# Patient Record
Sex: FEMALE | Race: WHITE | Employment: FULL TIME | URBAN - METROPOLITAN AREA
[De-identification: names, ages, dates, MRNs, and addresses within clinical notes are randomized per-mention and may not be internally consistent; named-entity substitution may affect disease eponyms.]

---
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
## 2020-01-31 ENCOUNTER — OFFICE VISIT (OUTPATIENT)
Dept: INTERNAL MEDICINE | Age: 20
End: 2020-01-31

## 2020-01-31 VITALS
TEMPERATURE: 98.6 F | OXYGEN SATURATION: 97 % | WEIGHT: 253.2 LBS | DIASTOLIC BLOOD PRESSURE: 72 MMHG | SYSTOLIC BLOOD PRESSURE: 118 MMHG | HEART RATE: 73 BPM | RESPIRATION RATE: 18 BRPM | HEIGHT: 67 IN | BODY MASS INDEX: 39.74 KG/M2

## 2020-01-31 PROCEDURE — 99213 OFFICE O/P EST LOW 20 MIN: CPT | Performed by: NURSE PRACTITIONER

## 2020-01-31 ASSESSMENT — ENCOUNTER SYMPTOMS
ABDOMINAL PAIN: 0
NAUSEA: 0
CHEST TIGHTNESS: 0
WHEEZING: 0
SHORTNESS OF BREATH: 0
EYE DISCHARGE: 0
EYE REDNESS: 0
DIARRHEA: 0
COUGH: 0
BACK PAIN: 0
RHINORRHEA: 0
VOMITING: 0
SORE THROAT: 0

## 2020-03-06 ENCOUNTER — OFFICE VISIT (OUTPATIENT)
Dept: INTERNAL MEDICINE | Age: 20
End: 2020-03-06

## 2020-03-06 VITALS
HEART RATE: 85 BPM | DIASTOLIC BLOOD PRESSURE: 78 MMHG | HEIGHT: 66 IN | RESPIRATION RATE: 18 BRPM | OXYGEN SATURATION: 98 % | BODY MASS INDEX: 40.98 KG/M2 | SYSTOLIC BLOOD PRESSURE: 112 MMHG | WEIGHT: 255 LBS | TEMPERATURE: 97.6 F

## 2020-03-06 LAB — S PYO AG THROAT QL: NORMAL

## 2020-03-06 PROCEDURE — 87880 STREP A ASSAY W/OPTIC: CPT | Performed by: NURSE PRACTITIONER

## 2020-03-06 PROCEDURE — 99213 OFFICE O/P EST LOW 20 MIN: CPT | Performed by: NURSE PRACTITIONER

## 2020-03-06 RX ORDER — AMOXICILLIN AND CLAVULANATE POTASSIUM 875; 125 MG/1; MG/1
1 TABLET, FILM COATED ORAL 2 TIMES DAILY
Qty: 14 TABLET | Refills: 0 | Status: SHIPPED | OUTPATIENT
Start: 2020-03-06 | End: 2020-03-13

## 2020-03-06 RX ORDER — TRAZODONE HYDROCHLORIDE 50 MG/1
TABLET ORAL
COMMUNITY
Start: 2020-02-21 | End: 2020-03-31

## 2020-03-06 ASSESSMENT — ENCOUNTER SYMPTOMS
NAUSEA: 0
RHINORRHEA: 0
SHORTNESS OF BREATH: 0
SORE THROAT: 1
SINUS PRESSURE: 0
VOMITING: 0
COUGH: 0
WHEEZING: 0
SINUS PAIN: 0

## 2020-03-06 ASSESSMENT — PATIENT HEALTH QUESTIONNAIRE - PHQ9
2. FEELING DOWN, DEPRESSED OR HOPELESS: 0
SUM OF ALL RESPONSES TO PHQ9 QUESTIONS 1 & 2: 0
SUM OF ALL RESPONSES TO PHQ QUESTIONS 1-9: 0
1. LITTLE INTEREST OR PLEASURE IN DOING THINGS: 0
SUM OF ALL RESPONSES TO PHQ QUESTIONS 1-9: 0

## 2020-03-06 NOTE — PROGRESS NOTES
Subjective:      Patient ID: Maurilio Griffith is a 21 y.o. female who presents today for:  Chief Complaint   Patient presents with    Pharyngitis     x 2 days, right ear pain, nasal congestion        Pharyngitis   This is a new problem. Episode onset: 2 days ago. The problem occurs constantly. The problem has been gradually worsening. Associated symptoms include congestion and a sore throat. Pertinent negatives include no arthralgias, chest pain, chills, coughing, fatigue, fever, headaches, myalgias, nausea, rash or vomiting. Nothing aggravates the symptoms. Treatments tried: dayquil, nyquil. The treatment provided mild relief. History reviewed. No pertinent past medical history. Past Surgical History:   Procedure Laterality Date    TONSILLECTOMY AND ADENOIDECTOMY      WISDOM TOOTH EXTRACTION       History reviewed. No pertinent family history. Allergies   Allergen Reactions    Bee Venom Swelling         Review of Systems   Constitutional: Negative for chills, fatigue and fever. HENT: Positive for congestion, ear pain and sore throat. Negative for postnasal drip, rhinorrhea, sinus pressure and sinus pain. Respiratory: Negative for cough, shortness of breath and wheezing. Cardiovascular: Negative for chest pain. Gastrointestinal: Negative for nausea and vomiting. Musculoskeletal: Negative for arthralgias and myalgias. Skin: Negative for rash. Neurological: Negative for headaches. Objective:   /78   Pulse 85   Temp 97.6 °F (36.4 °C) (Oral)   Resp 18   Ht 5' 6\" (1.676 m)   Wt 255 lb (115.7 kg)   SpO2 98%   BMI 41.16 kg/m²     Physical Exam  Vitals signs reviewed. Constitutional:       General: She is not in acute distress. Appearance: She is well-developed. She is not ill-appearing. HENT:      Head: Normocephalic. Right Ear: Ear canal and external ear normal. Tympanic membrane is erythematous and bulging.       Left Ear: Tympanic membrane, ear canal and external ear normal.      Nose: Nose normal. No mucosal edema, congestion or rhinorrhea. Right Sinus: No maxillary sinus tenderness or frontal sinus tenderness. Left Sinus: No maxillary sinus tenderness or frontal sinus tenderness. Mouth/Throat:      Lips: Pink. Mouth: Mucous membranes are moist.      Pharynx: Oropharynx is clear. No oropharyngeal exudate or posterior oropharyngeal erythema. Neck:      Musculoskeletal: Normal range of motion. Cardiovascular:      Rate and Rhythm: Normal rate and regular rhythm. Heart sounds: Normal heart sounds. Pulmonary:      Effort: Pulmonary effort is normal. No respiratory distress. Breath sounds: Normal breath sounds. No decreased breath sounds or wheezing. Abdominal:      General: Bowel sounds are normal.      Palpations: Abdomen is soft. Musculoskeletal: Normal range of motion. Lymphadenopathy:      Cervical: No cervical adenopathy. Skin:     General: Skin is warm and dry. Neurological:      Mental Status: She is alert and oriented to person, place, and time. Assessment:       Diagnosis Orders   1. Non-recurrent acute suppurative otitis media of right ear without spontaneous rupture of tympanic membrane  amoxicillin-clavulanate (AUGMENTIN) 875-125 MG per tablet   2. Sore throat  POCT rapid strep A         Plan:      Orders Placed This Encounter   Procedures    POCT rapid strep A     Orders Placed This Encounter   Medications    amoxicillin-clavulanate (AUGMENTIN) 875-125 MG per tablet     Sig: Take 1 tablet by mouth 2 times daily for 7 days     Dispense:  14 tablet     Refill:  0     Rapid strep negative. Treated for right OM. Reviewed usual course of illness and supportive measures for symptom management. Administration, side effects/adverse effects of all medications prescribed today, as well as treatment plan/rationale, follow-up care, and result expectations have been discussed with the patient.   Expresses understanding and desires to proceed with the treatment plan. Discussed signs and symptoms which require immediate follow-up in ED/call to 911. Understanding verbalized. I have reviewed and updated the electronic medical record. Return if symptoms worsen or fail to improve, for follow up with PCP.       MESFIN Suggs - NP

## 2020-03-09 LAB
ORGANISM: ABNORMAL
THROAT CULTURE: ABNORMAL
THROAT CULTURE: ABNORMAL

## 2020-03-23 ENCOUNTER — NURSE ONLY (OUTPATIENT)
Dept: FAMILY MEDICINE CLINIC | Age: 20
End: 2020-03-23

## 2020-03-23 PROCEDURE — 96372 THER/PROPH/DIAG INJ SC/IM: CPT | Performed by: FAMILY MEDICINE

## 2020-03-23 RX ORDER — MEDROXYPROGESTERONE ACETATE 150 MG/ML
150 INJECTION, SUSPENSION INTRAMUSCULAR ONCE
Status: COMPLETED | OUTPATIENT
Start: 2020-03-23 | End: 2020-03-23

## 2020-03-23 RX ADMIN — MEDROXYPROGESTERONE ACETATE 150 MG: 150 INJECTION, SUSPENSION INTRAMUSCULAR at 13:29

## 2020-03-31 RX ORDER — TRAZODONE HYDROCHLORIDE 50 MG/1
TABLET ORAL
Qty: 30 TABLET | Refills: 2 | Status: SHIPPED | OUTPATIENT
Start: 2020-03-31 | End: 2022-01-13

## 2020-03-31 NOTE — TELEPHONE ENCOUNTER
Rx requested:  Requested Prescriptions     Pending Prescriptions Disp Refills    traZODone (DESYREL) 50 MG tablet [Pharmacy Med Name: TRAZODONE 50 MG TABLET] 30 tablet      Sig: take 1 tablet by mouth NIGHTLY       Last Office Visit:   12/30/2019      Last filled:  2/21/2020    Next Visit Date:  Future Appointments   Date Time Provider Brittney Antonio   6/23/2020 11:15 AM SCHEDULE, [address] [facility]

## 2020-04-23 RX ORDER — TRAZODONE HYDROCHLORIDE 50 MG/1
TABLET ORAL
Qty: 30 TABLET | Refills: 2 | OUTPATIENT
Start: 2020-04-23

## 2020-06-11 ENCOUNTER — TELEPHONE (OUTPATIENT)
Dept: FAMILY MEDICINE CLINIC | Age: 20
End: 2020-06-11

## 2020-06-11 NOTE — TELEPHONE ENCOUNTER
Spotting while on depo shot is common and usually improves over time. The shot is still working.  Can come on time like usual.

## 2020-06-23 ENCOUNTER — NURSE ONLY (OUTPATIENT)
Dept: FAMILY MEDICINE CLINIC | Age: 20
End: 2020-06-23

## 2020-06-23 LAB
CONTROL: NORMAL
PREGNANCY TEST URINE, POC: NORMAL

## 2020-06-23 PROCEDURE — 81025 URINE PREGNANCY TEST: CPT | Performed by: FAMILY MEDICINE

## 2020-06-23 PROCEDURE — 96372 THER/PROPH/DIAG INJ SC/IM: CPT | Performed by: FAMILY MEDICINE

## 2020-06-23 RX ORDER — MEDROXYPROGESTERONE ACETATE 150 MG/ML
150 INJECTION, SUSPENSION INTRAMUSCULAR ONCE
Status: COMPLETED | OUTPATIENT
Start: 2020-06-23 | End: 2020-06-23

## 2020-06-23 RX ADMIN — MEDROXYPROGESTERONE ACETATE 150 MG: 150 INJECTION, SUSPENSION INTRAMUSCULAR at 11:24

## 2020-09-15 ENCOUNTER — NURSE ONLY (OUTPATIENT)
Dept: FAMILY MEDICINE CLINIC | Age: 20
End: 2020-09-15

## 2020-09-15 PROCEDURE — 96372 THER/PROPH/DIAG INJ SC/IM: CPT | Performed by: FAMILY MEDICINE

## 2020-09-15 RX ORDER — MEDROXYPROGESTERONE ACETATE 150 MG/ML
150 INJECTION, SUSPENSION INTRAMUSCULAR ONCE
Status: COMPLETED | OUTPATIENT
Start: 2020-09-15 | End: 2020-09-15

## 2020-09-15 RX ADMIN — MEDROXYPROGESTERONE ACETATE 150 MG: 150 INJECTION, SUSPENSION INTRAMUSCULAR at 13:19

## 2020-09-21 ENCOUNTER — APPOINTMENT (OUTPATIENT)
Dept: GENERAL RADIOLOGY | Age: 20
End: 2020-09-21

## 2020-09-21 ENCOUNTER — HOSPITAL ENCOUNTER (EMERGENCY)
Age: 20
Discharge: HOME | End: 2020-09-21
Attending: EMERGENCY MEDICINE

## 2020-09-21 VITALS
DIASTOLIC BLOOD PRESSURE: 70 MMHG | WEIGHT: 221 LBS | OXYGEN SATURATION: 99 % | RESPIRATION RATE: 18 BRPM | TEMPERATURE: 98.4 F | BODY MASS INDEX: 35.52 KG/M2 | HEART RATE: 95 BPM | SYSTOLIC BLOOD PRESSURE: 116 MMHG | HEIGHT: 66 IN

## 2020-09-21 LAB
BILIRUBIN URINE: NEGATIVE
BLOOD, URINE: NEGATIVE
CLARITY: CLEAR
COLOR: YELLOW
GLUCOSE URINE: NEGATIVE MG/DL
HCG(URINE) PREGNANCY TEST: NEGATIVE
KETONES, URINE: NEGATIVE MG/DL
LEUKOCYTE ESTERASE, URINE: NEGATIVE
NITRITE, URINE: NEGATIVE
PH UA: 6.5 (ref 5–9)
PROTEIN UA: NEGATIVE MG/DL
SPECIFIC GRAVITY UA: 1.02 (ref 1–1.03)
URINE REFLEX TO CULTURE: NORMAL
UROBILINOGEN, URINE: 0.2 E.U./DL

## 2020-09-21 PROCEDURE — 81003 URINALYSIS AUTO W/O SCOPE: CPT

## 2020-09-21 PROCEDURE — 99283 EMERGENCY DEPT VISIT LOW MDM: CPT

## 2020-09-21 PROCEDURE — 84703 CHORIONIC GONADOTROPIN ASSAY: CPT

## 2020-09-21 PROCEDURE — 96372 THER/PROPH/DIAG INJ SC/IM: CPT

## 2020-09-21 PROCEDURE — 6360000002 HC RX W HCPCS: Performed by: EMERGENCY MEDICINE

## 2020-09-21 PROCEDURE — 72110 X-RAY EXAM L-2 SPINE 4/>VWS: CPT

## 2020-09-21 PROCEDURE — 6370000000 HC RX 637 (ALT 250 FOR IP): Performed by: EMERGENCY MEDICINE

## 2020-09-21 RX ORDER — HYDROCODONE BITARTRATE AND ACETAMINOPHEN 5; 325 MG/1; MG/1
1 TABLET ORAL EVERY 6 HOURS PRN
Qty: 12 TABLET | Refills: 0 | Status: SHIPPED | OUTPATIENT
Start: 2020-09-21 | End: 2020-09-25

## 2020-09-21 RX ORDER — KETOROLAC TROMETHAMINE 30 MG/ML
60 INJECTION, SOLUTION INTRAMUSCULAR; INTRAVENOUS ONCE
Status: COMPLETED | OUTPATIENT
Start: 2020-09-21 | End: 2020-09-21

## 2020-09-21 RX ORDER — CYCLOBENZAPRINE HCL 10 MG
10 TABLET ORAL ONCE
Status: COMPLETED | OUTPATIENT
Start: 2020-09-21 | End: 2020-09-21

## 2020-09-21 RX ORDER — CYCLOBENZAPRINE HCL 10 MG
10 TABLET ORAL 3 TIMES DAILY PRN
Qty: 21 TABLET | Refills: 0 | Status: SHIPPED | OUTPATIENT
Start: 2020-09-21 | End: 2020-10-01

## 2020-09-21 RX ORDER — PHENYLPROPANOLAMINE/CLEMASTINE 75-1.34MG
5 TABLET, EXTENDED RELEASE ORAL ONCE
COMMUNITY
End: 2022-01-13

## 2020-09-21 RX ADMIN — KETOROLAC TROMETHAMINE 60 MG: 30 INJECTION, SOLUTION INTRAMUSCULAR at 16:39

## 2020-09-21 RX ADMIN — CYCLOBENZAPRINE HYDROCHLORIDE 10 MG: 10 TABLET, FILM COATED ORAL at 16:39

## 2020-09-21 ASSESSMENT — PAIN SCALES - GENERAL
PAINLEVEL_OUTOF10: 5
PAINLEVEL_OUTOF10: 9
PAINLEVEL_OUTOF10: 8

## 2020-09-21 ASSESSMENT — ENCOUNTER SYMPTOMS
SORE THROAT: 0
CHOKING: 0
EYE DISCHARGE: 0
ABDOMINAL PAIN: 0
SHORTNESS OF BREATH: 0
EYE REDNESS: 0
BACK PAIN: 1
BLOOD IN STOOL: 0
STRIDOR: 0
EYE PAIN: 0
DIARRHEA: 0
WHEEZING: 0
FACIAL SWELLING: 0
SINUS PRESSURE: 0
VOICE CHANGE: 0
CHEST TIGHTNESS: 0
COUGH: 0
VOMITING: 0
CONSTIPATION: 0
TROUBLE SWALLOWING: 0

## 2020-09-21 ASSESSMENT — PAIN DESCRIPTION - PAIN TYPE: TYPE: ACUTE PAIN

## 2020-09-21 ASSESSMENT — PAIN DESCRIPTION - DESCRIPTORS
DESCRIPTORS: ACHING
DESCRIPTORS: SHOOTING

## 2020-09-21 ASSESSMENT — PAIN - FUNCTIONAL ASSESSMENT: PAIN_FUNCTIONAL_ASSESSMENT: 0-10

## 2020-09-21 ASSESSMENT — PAIN DESCRIPTION - LOCATION: LOCATION: BACK

## 2020-09-21 ASSESSMENT — PAIN DESCRIPTION - ONSET: ONSET: ON-GOING

## 2020-09-21 ASSESSMENT — PAIN DESCRIPTION - ORIENTATION: ORIENTATION: LOWER

## 2020-09-21 ASSESSMENT — PAIN DESCRIPTION - FREQUENCY: FREQUENCY: CONTINUOUS

## 2020-09-21 ASSESSMENT — PAIN DESCRIPTION - PROGRESSION: CLINICAL_PROGRESSION: GRADUALLY IMPROVING

## 2020-09-21 NOTE — ED PROVIDER NOTES
[facility] ED  eMERGENCY dEPARTMENT eNCOUnter      Pt Name: Genna Epperson  MRN: [id number]  [place] 2000  Date of evaluation: 2020  Provider: Ghislaine Claudio MD    [address] [place] [address]       Chief Complaint   Patient presents with    Back Pain     mid to lower x 3days         HISTORY OF PRESENT ILLNESS   (Location/Symptom, Timing/Onset,Context/Setting, Quality, Duration, Modifying Factors, Severity)  Note limiting factors. Genna Epperson is a 21 y.o. female who presents to the emergency department patient come to this emergency because of the increasing back pain for the past 2 days time worse with movement or when she lie flat patient history obesity dysfunctional uterine bleeding patient is on birth control pill at this time  0 para 0 has no previous back surgery no fever no chills no UTI symptoms no history of kidney stone rated her pain as 6-7 out of 10    HPI    NursingNotes were reviewed. REVIEW OF SYSTEMS    (2-9 systems for level 4, 10 or more for level 5)     Review of Systems   Constitutional: Positive for activity change. Negative for fever. HENT: Negative for congestion, drooling, facial swelling, mouth sores, nosebleeds, sinus pressure, sore throat, trouble swallowing and voice change. Eyes: Negative for pain, discharge, redness and visual disturbance. Respiratory: Negative for cough, choking, chest tightness, shortness of breath, wheezing and stridor. Cardiovascular: Negative for chest pain, palpitations and leg swelling. Gastrointestinal: Negative for abdominal pain, blood in stool, constipation, diarrhea and vomiting. Endocrine: Negative for cold intolerance, polyphagia and polyuria. Genitourinary: Negative for dysuria, flank pain, frequency, genital sores and urgency. Musculoskeletal: Positive for arthralgias, back pain, gait problem and myalgias. Negative for joint swelling, neck pain and neck stiffness. Skin: Negative for pallor and rash. Neurological: Negative for tremors, seizures, syncope, weakness, numbness and headaches. Hematological: Negative for adenopathy. Does not bruise/bleed easily. Psychiatric/Behavioral: Negative for agitation, behavioral problems, hallucinations and sleep disturbance. The patient is not hyperactive. All other systems reviewed and are negative. Except as noted above the remainder of the review of systems was reviewed and negative. PAST MEDICAL HISTORY   History reviewed. No pertinent past medical history. SURGICALHISTORY       Past Surgical History:   Procedure Laterality Date    TONSILLECTOMY AND ADENOIDECTOMY      WISDOM TOOTH EXTRACTION           CURRENT MEDICATIONS       Previous Medications    EPINEPHRINE (EPIPEN) 0.3 MG/0.3ML SOAJ INJECTION    Inject into the skin    ESTRADIOL CYPIONATE (DEPO-ESTRADIOL) 5 MG/ML INJECTION    Inject 5 mg into the muscle once    TRAZODONE (DESYREL) 50 MG TABLET    take 1 tablet by mouth NIGHTLY       ALLERGIES     Bee venom    FAMILY HISTORY     History reviewed. No pertinent family history.        SOCIAL HISTORY       Social History     Socioeconomic History    Marital status: Single     Spouse name: None    Number of children: None    Years of education: None    Highest education level: None   Occupational History    None   Social Needs    Financial resource strain: None    Food insecurity     Worry: None     Inability: None    Transportation needs     Medical: None     Non-medical: None   Tobacco Use    Smoking status: Passive Smoke Exposure - Never Smoker    Smokeless tobacco: Never Used   Substance and Sexual Activity    Alcohol use: No    Drug use: No    Sexual activity: Never   Lifestyle    Physical activity     Days per week: None     Minutes per session: None    Stress: None   Relationships    Social connections     Talks on phone: None     Gets together: None     Attends [employer] service: None     Active member of club or organization: None     Attends meetings of clubs or organizations: None     Relationship status: None    Intimate partner violence     Fear of current or ex partner: None     Emotionally abused: None     Physically abused: None     Forced sexual activity: None   Other Topics Concern    None   Social History Narrative    None       SCREENINGS      @FLOW([id number])@      PHYSICAL EXAM    (up to 7 for level 4, 8 or more for level 5)     ED Triage Vitals [09/21/20 1609]   BP Temp Temp Source Pulse Resp SpO2 Height Weight   -- 98.3 °F (36.8 °C) Oral 69 18 98 % 5' 6\" (1.676 m) 221 lb (100.2 kg)       Physical Exam  Vitals signs and nursing note reviewed. Constitutional:       Appearance: She is well-developed. She is obese. Comments: Active alert cooperative patient slightly uncomfortable when change position otherwise nontoxic appearance afebrile no evidence of dehydration   HENT:      Head: Normocephalic and atraumatic. Right Ear: Tympanic membrane, ear canal and external ear normal.      Left Ear: Tympanic membrane, ear canal and external ear normal.      Nose: Nose normal.      Mouth/Throat:      Mouth: Mucous membranes are moist.   Eyes:      General:         Left eye: No discharge. Extraocular Movements: Extraocular movements intact. Pupils: Pupils are equal, round, and reactive to light. Neck:      Musculoskeletal: Normal range of motion and neck supple. No neck rigidity or muscular tenderness. Vascular: No carotid bruit. Cardiovascular:      Rate and Rhythm: Normal rate and regular rhythm. Heart sounds: Normal heart sounds. No murmur. No friction rub. No gallop. Pulmonary:      Effort: Pulmonary effort is normal. No respiratory distress. Breath sounds: Normal breath sounds. No wheezing. Abdominal:      General: Bowel sounds are normal. There is no distension. Palpations: Abdomen is soft. There is no mass. Tenderness: There is no abdominal tenderness.  There 18   Temp: 98.3 °F (36.8 °C)   TempSrc: Oral   SpO2: 98%   Weight: 221 lb (100.2 kg)   Height: 5' 6\" (1.676 m)           MDM    CRITICAL CARE TIME   Total Critical Care time was  minutes, excluding separately reportableprocedures. There was a high probability of clinicallysignificant/life threatening deterioration in the patient's condition which required my urgent intervention. NSULTS:  None    PROCEDURES:  Unless otherwise noted below, none     Procedures    FINAL IMPRESSION      1. Strain of lumbar region, initial encounter    2. Spasm of muscle          DISPOSITION/PLAN   DISPOSITION Decision To Discharge 09/21/2020 05:11:36 PM      PATIENT REFERRED TO:  Sukhjinder Butler MD  [address] [facility] [address] [phone]    In 1 week        DISCHARGE MEDICATIONS:  New Prescriptions    CYCLOBENZAPRINE (FLEXERIL) 10 MG TABLET    Take 1 tablet by mouth 3 times daily as needed for Muscle spasms    HYDROCODONE-ACETAMINOPHEN (NORCO) 5-325 MG PER TABLET    Take 1 tablet by mouth every 6 hours as needed for Pain for up to 4 days.           (Please note that portions of this note were completed with a voice recognition program.  Efforts were made to edit the dictations but occasionally words are mis-transcribed.)    Clearance MD Fahad (electronically signed)  Attending Emergency Physician       Clearance MD Fahad  09/21/20

## 2020-12-08 ENCOUNTER — NURSE ONLY (OUTPATIENT)
Dept: FAMILY MEDICINE CLINIC | Age: 20
End: 2020-12-08

## 2020-12-08 PROCEDURE — 96372 THER/PROPH/DIAG INJ SC/IM: CPT | Performed by: FAMILY MEDICINE

## 2020-12-08 RX ORDER — MEDROXYPROGESTERONE ACETATE 150 MG/ML
150 INJECTION, SUSPENSION INTRAMUSCULAR ONCE
Status: COMPLETED | OUTPATIENT
Start: 2020-12-08 | End: 2020-12-08

## 2020-12-08 RX ADMIN — MEDROXYPROGESTERONE ACETATE 150 MG: 150 INJECTION, SUSPENSION INTRAMUSCULAR at 13:28

## 2021-02-23 ENCOUNTER — NURSE ONLY (OUTPATIENT)
Dept: FAMILY MEDICINE CLINIC | Age: 21
End: 2021-02-23

## 2021-02-23 DIAGNOSIS — N93.8 DUB (DYSFUNCTIONAL UTERINE BLEEDING): Primary | ICD-10-CM

## 2021-02-23 PROCEDURE — 96372 THER/PROPH/DIAG INJ SC/IM: CPT | Performed by: FAMILY MEDICINE

## 2021-02-23 RX ORDER — MEDROXYPROGESTERONE ACETATE 150 MG/ML
150 INJECTION, SUSPENSION INTRAMUSCULAR ONCE
Status: COMPLETED | OUTPATIENT
Start: 2021-02-23 | End: 2021-02-23

## 2021-02-23 RX ADMIN — MEDROXYPROGESTERONE ACETATE 150 MG: 150 INJECTION, SUSPENSION INTRAMUSCULAR at 10:59

## 2021-02-28 ENCOUNTER — APPOINTMENT (OUTPATIENT)
Dept: GENERAL RADIOLOGY | Age: 21
End: 2021-02-28

## 2021-02-28 ENCOUNTER — HOSPITAL ENCOUNTER (EMERGENCY)
Age: 21
Discharge: HOME | End: 2021-02-28

## 2021-02-28 VITALS
SYSTOLIC BLOOD PRESSURE: 118 MMHG | HEIGHT: 66 IN | DIASTOLIC BLOOD PRESSURE: 82 MMHG | BODY MASS INDEX: 39.37 KG/M2 | RESPIRATION RATE: 17 BRPM | OXYGEN SATURATION: 99 % | WEIGHT: 245 LBS | TEMPERATURE: 96.9 F | HEART RATE: 95 BPM

## 2021-02-28 DIAGNOSIS — M62.838 SPASM OF MUSCLE: ICD-10-CM

## 2021-02-28 DIAGNOSIS — S39.012A STRAIN OF LUMBAR REGION, INITIAL ENCOUNTER: Primary | ICD-10-CM

## 2021-02-28 DIAGNOSIS — S39.012A BACK STRAIN, INITIAL ENCOUNTER: ICD-10-CM

## 2021-02-28 PROCEDURE — 6370000000 HC RX 637 (ALT 250 FOR IP): Performed by: PHYSICIAN ASSISTANT

## 2021-02-28 PROCEDURE — 99284 EMERGENCY DEPT VISIT MOD MDM: CPT

## 2021-02-28 PROCEDURE — 72110 X-RAY EXAM L-2 SPINE 4/>VWS: CPT

## 2021-02-28 PROCEDURE — 72072 X-RAY EXAM THORAC SPINE 3VWS: CPT

## 2021-02-28 RX ORDER — CYCLOBENZAPRINE HCL 10 MG
10 TABLET ORAL 3 TIMES DAILY PRN
Qty: 21 TABLET | Refills: 0 | Status: SHIPPED | OUTPATIENT
Start: 2021-02-28 | End: 2021-03-10

## 2021-02-28 RX ORDER — CYCLOBENZAPRINE HCL 10 MG
10 TABLET ORAL ONCE
Status: COMPLETED | OUTPATIENT
Start: 2021-02-28 | End: 2021-02-28

## 2021-02-28 RX ORDER — OXYCODONE HYDROCHLORIDE AND ACETAMINOPHEN 5; 325 MG/1; MG/1
1 TABLET ORAL ONCE
Status: COMPLETED | OUTPATIENT
Start: 2021-02-28 | End: 2021-02-28

## 2021-02-28 RX ADMIN — OXYCODONE HYDROCHLORIDE AND ACETAMINOPHEN 1 TABLET: 5; 325 TABLET ORAL at 10:47

## 2021-02-28 RX ADMIN — CYCLOBENZAPRINE 10 MG: 10 TABLET, FILM COATED ORAL at 10:46

## 2021-02-28 ASSESSMENT — ENCOUNTER SYMPTOMS
VOMITING: 0
ABDOMINAL PAIN: 0
CONSTIPATION: 0
NAUSEA: 0
COLOR CHANGE: 0
CHEST TIGHTNESS: 0
WHEEZING: 0
SHORTNESS OF BREATH: 0
DIARRHEA: 0
BACK PAIN: 1

## 2021-02-28 ASSESSMENT — PAIN SCALES - GENERAL
PAINLEVEL_OUTOF10: 7
PAINLEVEL_OUTOF10: 8

## 2021-02-28 ASSESSMENT — PAIN DESCRIPTION - ONSET: ONSET: ON-GOING

## 2021-02-28 ASSESSMENT — PAIN DESCRIPTION - PAIN TYPE: TYPE: ACUTE PAIN

## 2021-02-28 ASSESSMENT — PAIN DESCRIPTION - ORIENTATION: ORIENTATION: MID

## 2021-02-28 ASSESSMENT — PAIN DESCRIPTION - LOCATION: LOCATION: BACK

## 2021-02-28 NOTE — ED PROVIDER NOTES
[facility] ED  eMERGENCY dEPARTMENT eNCOUnter      Pt Name: Swati Pereira  MRN: [id number]  [place] 2000  Date of evaluation: 2/28/2021  Provider: KRISTEN Flores        HISTORY OF PRESENT ILLNESS    Swati Pereira is a 24 y.o. female per chart review has a h/o DUB, obesity, bee sting allergy, presents to the ED with c/o sudden onset mid-low back pain with associated spasms without pain radiation since last night. Notes inciting event was small child that jumped on her back, pushing her forward. She denies falling, and notes she was able to stand up and \"correct\" herself, however felt sharp pain to back following event. Notes she has had a previous back strain with spasms and \"this feels similar to that time\". Denies saddle anesthesia, IVDU or urinary complaints/symptoms. REVIEW OF SYSTEMS       Review of Systems   Constitutional: Positive for activity change. Negative for appetite change, chills, diaphoresis, fatigue and fever. Respiratory: Negative for chest tightness, shortness of breath and wheezing. Cardiovascular: Negative for chest pain, palpitations and leg swelling. Gastrointestinal: Negative for abdominal pain, constipation, diarrhea, nausea and vomiting. Genitourinary: Negative for difficulty urinating, dysuria, enuresis, flank pain, frequency, hematuria, pelvic pain and urgency. Musculoskeletal: Positive for arthralgias, back pain and myalgias. Negative for gait problem, joint swelling, neck pain and neck stiffness. Skin: Negative for color change, pallor, rash and wound. Neurological: Negative for dizziness, tremors, syncope, facial asymmetry, weakness, light-headedness, numbness and headaches. All other systems reviewed and are negative. Except as noted above the remainder of the review of systems was reviewed and negative. PAST MEDICAL HISTORY   History reviewed. No pertinent past medical history.       SURGICAL HISTORY       Past Surgical History: Coronary artery disease is improving with treatment. Continue current treatment regimen. Cardiac status will be reassessed in 6 months. Procedure Laterality Date    TONSILLECTOMY AND ADENOIDECTOMY      WISDOM TOOTH EXTRACTION           CURRENT MEDICATIONS       Previous Medications    EPINEPHRINE (EPIPEN) 0.3 MG/0.3ML SOAJ INJECTION    Inject into the skin    ESTRADIOL CYPIONATE (DEPO-ESTRADIOL) 5 MG/ML INJECTION    Inject 5 mg into the muscle once    TRAZODONE (DESYREL) 50 MG TABLET    take 1 tablet by mouth NIGHTLY       ALLERGIES     Bee venom    FAMILY HISTORY     History reviewed. No pertinent family history. SOCIAL HISTORY       Social History     Socioeconomic History    Marital status: Single     Spouse name: None    Number of children: None    Years of education: None    Highest education level: None   Occupational History    None   Social Needs    Financial resource strain: None    Food insecurity     Worry: None     Inability: None    Transportation needs     Medical: None     Non-medical: None   Tobacco Use    Smoking status: Passive Smoke Exposure - Never Smoker    Smokeless tobacco: Never Used   Substance and Sexual Activity    Alcohol use: No    Drug use: No    Sexual activity: Never   Lifestyle    Physical activity     Days per week: None     Minutes per session: None    Stress: None   Relationships    Social connections     Talks on phone: None     Gets together: None     Attends Denominational service: None     Active member of club or organization: None     Attends meetings of clubs or organizations: None     Relationship status: None    Intimate partner violence     Fear of current or ex partner: None     Emotionally abused: None     Physically abused: None     Forced sexual activity: None   Other Topics Concern    None   Social History Narrative    None         PHYSICAL EXAM        ED Triage Vitals [02/28/21 1029]   BP Temp Temp Source Pulse Resp SpO2 Height Weight   118/82 96.9 °F (36.1 °C) Temporal 95 17 99 % 5' 6\" (1.676 m) 245 lb (111.1 kg)       Physical Exam  Vitals signs and nursing note reviewed. Constitutional:       General: She is not in acute distress. Appearance: Normal appearance. She is normal weight. She is not toxic-appearing. HENT:      Head: Normocephalic and atraumatic. Nose: Nose normal.      Mouth/Throat:      Mouth: Mucous membranes are moist.      Pharynx: Oropharynx is clear. No oropharyngeal exudate. Eyes:      Extraocular Movements: Extraocular movements intact. Conjunctiva/sclera: Conjunctivae normal.      Pupils: Pupils are equal, round, and reactive to light. Neck:      Musculoskeletal: Normal range of motion and neck supple. No muscular tenderness. Cardiovascular:      Rate and Rhythm: Normal rate and regular rhythm. Pulses: Normal pulses. Heart sounds: Normal heart sounds. Pulmonary:      Effort: Pulmonary effort is normal.      Breath sounds: Normal breath sounds. Abdominal:      General: Abdomen is flat. Bowel sounds are normal. There is no distension. Palpations: Abdomen is soft. Tenderness: There is no abdominal tenderness. Musculoskeletal: Normal range of motion. General: No swelling, tenderness or deformity. Right lower leg: No edema. Left lower leg: No edema. Comments: +thoracolumbar paravertebral tenderness to palpation with radiation midline, no CVAT or overlying skin changes. Lymphadenopathy:      Cervical: No cervical adenopathy. Skin:     General: Skin is warm and dry. Capillary Refill: Capillary refill takes less than 2 seconds. Neurological:      General: No focal deficit present. Mental Status: She is alert and oriented to person, place, and time. Mental status is at baseline. Sensory: No sensory deficit. Motor: No weakness. Coordination: Coordination normal.      Gait: Gait normal.      Comments: Stable gait witnessed walking into room and unassisted movement to hospital bed.     Psychiatric:         Mood and Affect: Mood normal.         Behavior: Behavior normal. Thought Content: Thought content normal.         Judgment: Judgment normal.           LABS:  Labs Reviewed - No data to display      MDM:   Vitals:    Vitals:    02/28/21 1029   BP: 118/82   Pulse: 95   Resp: 17   Temp: 96.9 °F (36.1 °C)   TempSrc: Temporal   SpO2: 99%   Weight: 245 lb (111.1 kg)   Height: 5' 6\" (1.676 m)       24year old female presents to the ED with c/o back pain. Triage records were reviewed. Medical records were reviewed. Nursing notes were reviewed and incorporated. Patient afebrile, hemodynamically stable, non-toxic in appearance upon initial evaluation. Labs/Imaging:   XR thoracic/lumbar    Consults:   NA    Meds given in ED:   Percocet, Flexeril    Presentation and work-up outlined above consistent with thoracolumbar strain. 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 Return precautions instructed. CRITICAL CARE TIME   Total CriticalCare time was 0 minutes, excluding separately reportable procedures. There was a high probability of clinically significant/life threatening deterioration in the patient's condition which required my urgent intervention. PROCEDURES:  Unlessotherwise noted below, none      Procedures      FINAL IMPRESSION      1. Strain of lumbar region, initial encounter    2. Back strain, initial encounter    3.  Spasm of muscle          DISPOSITION/PLAN   DISPOSITION Decision To Discharge 02/28/2021 11:51:06 AM          KRISTEN Hines (electronically signed)  Attending Emergency Physician          KRISTEN Hines  02/28/21 [id number]

## 2021-02-28 NOTE — ED TRIAGE NOTES
Pt presents to ED from home with c/o back pain. Pt reports that pain is located in her mid-back and has been ongoing since last night. Pt denies injury/heavy lifting. Pt states that she has used tylenol and icy-hot w/o relief. Upon assessment, pt is A/Ox4, skin p/w/d, resp even and unlabored, msp's intact. Pt denies cp, sob, n/v/d, fever, or chills.

## 2021-05-17 ENCOUNTER — NURSE ONLY (OUTPATIENT)
Dept: FAMILY MEDICINE CLINIC | Age: 21
End: 2021-05-17

## 2021-05-17 DIAGNOSIS — N93.8 DUB (DYSFUNCTIONAL UTERINE BLEEDING): Primary | ICD-10-CM

## 2021-05-17 PROCEDURE — 96372 THER/PROPH/DIAG INJ SC/IM: CPT | Performed by: FAMILY MEDICINE

## 2021-05-17 RX ORDER — MEDROXYPROGESTERONE ACETATE 150 MG/ML
150 INJECTION, SUSPENSION INTRAMUSCULAR ONCE
Status: COMPLETED | OUTPATIENT
Start: 2021-05-17 | End: 2021-05-17

## 2021-05-17 RX ADMIN — MEDROXYPROGESTERONE ACETATE 150 MG: 150 INJECTION, SUSPENSION INTRAMUSCULAR at 15:46

## 2021-08-10 ENCOUNTER — NURSE ONLY (OUTPATIENT)
Dept: FAMILY MEDICINE CLINIC | Age: 21
End: 2021-08-10

## 2021-08-10 DIAGNOSIS — N93.8 DUB (DYSFUNCTIONAL UTERINE BLEEDING): Primary | ICD-10-CM

## 2021-08-10 PROCEDURE — 96372 THER/PROPH/DIAG INJ SC/IM: CPT | Performed by: FAMILY MEDICINE

## 2021-08-10 RX ORDER — MEDROXYPROGESTERONE ACETATE 150 MG/ML
150 INJECTION, SUSPENSION INTRAMUSCULAR ONCE
Status: COMPLETED | OUTPATIENT
Start: 2021-08-10 | End: 2021-08-10

## 2021-08-10 RX ADMIN — MEDROXYPROGESTERONE ACETATE 150 MG: 150 INJECTION, SUSPENSION INTRAMUSCULAR at 13:50

## 2021-10-25 ENCOUNTER — NURSE ONLY (OUTPATIENT)
Dept: FAMILY MEDICINE CLINIC | Age: 21
End: 2021-10-25

## 2021-10-25 DIAGNOSIS — N93.8 DUB (DYSFUNCTIONAL UTERINE BLEEDING): Primary | ICD-10-CM

## 2021-10-25 PROCEDURE — 96372 THER/PROPH/DIAG INJ SC/IM: CPT | Performed by: FAMILY MEDICINE

## 2021-10-25 RX ORDER — MEDROXYPROGESTERONE ACETATE 150 MG/ML
150 INJECTION, SUSPENSION INTRAMUSCULAR ONCE
Status: COMPLETED | OUTPATIENT
Start: 2021-10-25 | End: 2021-10-25

## 2021-10-25 RX ADMIN — MEDROXYPROGESTERONE ACETATE 150 MG: 150 INJECTION, SUSPENSION INTRAMUSCULAR at 15:08

## 2022-01-13 ENCOUNTER — OFFICE VISIT (OUTPATIENT)
Dept: FAMILY MEDICINE CLINIC | Age: 22
End: 2022-01-13

## 2022-01-13 ENCOUNTER — NURSE ONLY (OUTPATIENT)
Dept: FAMILY MEDICINE CLINIC | Age: 22
End: 2022-01-13

## 2022-01-13 VITALS
OXYGEN SATURATION: 98 % | HEART RATE: 80 BPM | WEIGHT: 293 LBS | TEMPERATURE: 98 F | DIASTOLIC BLOOD PRESSURE: 70 MMHG | SYSTOLIC BLOOD PRESSURE: 122 MMHG | BODY MASS INDEX: 50.36 KG/M2

## 2022-01-13 DIAGNOSIS — F32.A DEPRESSION, UNSPECIFIED DEPRESSION TYPE: ICD-10-CM

## 2022-01-13 DIAGNOSIS — N93.8 DUB (DYSFUNCTIONAL UTERINE BLEEDING): Primary | ICD-10-CM

## 2022-01-13 DIAGNOSIS — Z86.16 HISTORY OF COVID-19: Primary | ICD-10-CM

## 2022-01-13 DIAGNOSIS — N93.8 DUB (DYSFUNCTIONAL UTERINE BLEEDING): ICD-10-CM

## 2022-01-13 PROCEDURE — 99214 OFFICE O/P EST MOD 30 MIN: CPT | Performed by: FAMILY MEDICINE

## 2022-01-13 PROCEDURE — 96372 THER/PROPH/DIAG INJ SC/IM: CPT | Performed by: FAMILY MEDICINE

## 2022-01-13 RX ORDER — MEDROXYPROGESTERONE ACETATE 150 MG/ML
150 INJECTION, SUSPENSION INTRAMUSCULAR ONCE
Status: COMPLETED | OUTPATIENT
Start: 2022-01-13 | End: 2022-01-13

## 2022-01-13 RX ORDER — TRAZODONE HYDROCHLORIDE 50 MG/1
TABLET ORAL
Qty: 90 TABLET | Refills: 3 | Status: SHIPPED | OUTPATIENT
Start: 2022-01-13 | End: 2022-06-16 | Stop reason: SDUPTHER

## 2022-01-13 RX ADMIN — MEDROXYPROGESTERONE ACETATE 150 MG: 150 INJECTION, SUSPENSION INTRAMUSCULAR at 16:29

## 2022-01-13 SDOH — ECONOMIC STABILITY: FOOD INSECURITY: WITHIN THE PAST 12 MONTHS, THE FOOD YOU BOUGHT JUST DIDN'T LAST AND YOU DIDN'T HAVE MONEY TO GET MORE.: NEVER TRUE

## 2022-01-13 SDOH — ECONOMIC STABILITY: FOOD INSECURITY: WITHIN THE PAST 12 MONTHS, YOU WORRIED THAT YOUR FOOD WOULD RUN OUT BEFORE YOU GOT MONEY TO BUY MORE.: NEVER TRUE

## 2022-01-13 ASSESSMENT — PATIENT HEALTH QUESTIONNAIRE - PHQ9
SUM OF ALL RESPONSES TO PHQ QUESTIONS 1-9: 0
SUM OF ALL RESPONSES TO PHQ9 QUESTIONS 1 & 2: 0
2. FEELING DOWN, DEPRESSED OR HOPELESS: 0
1. LITTLE INTEREST OR PLEASURE IN DOING THINGS: 0
SUM OF ALL RESPONSES TO PHQ QUESTIONS 1-9: 0

## 2022-01-13 ASSESSMENT — ENCOUNTER SYMPTOMS
DIARRHEA: 0
RHINORRHEA: 0
COUGH: 0
WHEEZING: 0
SORE THROAT: 0
CONSTIPATION: 0
ABDOMINAL PAIN: 0
SHORTNESS OF BREATH: 1

## 2022-01-13 ASSESSMENT — SOCIAL DETERMINANTS OF HEALTH (SDOH): HOW HARD IS IT FOR YOU TO PAY FOR THE VERY BASICS LIKE FOOD, HOUSING, MEDICAL CARE, AND HEATING?: NOT HARD AT ALL

## 2022-01-13 NOTE — PROGRESS NOTES
6901 [facility] 1840 [facility] PRIMARY CARE  [address] [place] [address]  Dept: [phone]  Dept Fax: [phone]  Loc: [phone]     Chief Complaint  Chief Complaint   Patient presents with    Shortness of Breath     since having covid on 12/23/2021, still having issues    Irregular Menses     due for her depo injection       HPI:  25 y. o.female who presents for the following:      DUB: started depo shot years ago; improved with depo shot; rare spotting. SOB: had COVID 12/23 and has recovered; still has some SOB more noticeable with exertion on treadmill and while working at [employer]; takes 10min to recover; no wheezing but was last week; no fevers; tolerating PO; still coughing    Mood: had been on trazodone for sleep and depression; ran out and would like to restart    Review of Systems   Constitutional: Negative for chills and fever. HENT: Negative for congestion, rhinorrhea and sore throat. Respiratory: Positive for shortness of breath. Negative for cough and wheezing. Gastrointestinal: Negative for abdominal pain, constipation and diarrhea. Endocrine: Negative for polydipsia and polyuria. Genitourinary: Positive for menstrual problem. Negative for dysuria, frequency and urgency. Neurological: Negative for syncope, light-headedness, numbness and headaches. Psychiatric/Behavioral: Positive for dysphoric mood. Negative for sleep disturbance. The patient is not nervous/anxious.         Past Medical History:   Diagnosis Date    COVID-19     12/23/2021     Past Surgical History:   Procedure Laterality Date    TONSILLECTOMY AND ADENOIDECTOMY      WISDOM TOOTH EXTRACTION       Social History     Socioeconomic History    Marital status: Single     Spouse name: Not on file    Number of children: Not on file    Years of education: Not on file    Highest education level: Not on file   Occupational History    Not on file   Tobacco Use    Smoking status: Passive Smoke Exposure - Never Smoker    Smokeless tobacco: Never Used   Substance and Sexual Activity    Alcohol use: No    Drug use: No    Sexual activity: Never   Other Topics Concern    Not on file   Social History Narrative    Not on file     Social Determinants of Health     Financial Resource Strain: Low Risk     Difficulty of Paying Living Expenses: Not hard at all   Food Insecurity: No Food Insecurity    Worried About Running Out of Food in the Last Year: Never true    Joni of Food in the Last Year: Never true   Transportation Needs:     Lack of Transportation (Medical): Not on file    Lack of Transportation (Non-Medical): Not on file   Physical Activity:     Days of Exercise per Week: Not on file    Minutes of Exercise per Session: Not on file   Stress:     Feeling of Stress : Not on file   Social Connections:     Frequency of Communication with Friends and Family: Not on file    Frequency of Social Gatherings with Friends and Family: Not on file    Attends Religion Services: Not on file    Active Member of [address] [place] [address] or Organizations: Not on file    Attends Club or Organization Meetings: Not on file    Marital Status: Not on file   Intimate Partner Violence:     Fear of Current or Ex-Partner: Not on file    Emotionally Abused: Not on file    Physically Abused: Not on file    Sexually Abused: Not on file   Housing Stability:     Unable to Pay for Housing in the Last Year: Not on file    Number of [place] in the Last Year: Not on file    Unstable Housing in the Last Year: Not on file     No family history on file. Allergies   Allergen Reactions    Bee Venom Swelling     Current Outpatient Medications   Medication Sig Dispense Refill    traZODone (DESYREL) 50 MG tablet take 1 tablet by mouth NIGHTLY 90 tablet 3     No current facility-administered medications for this visit.          Vitals:    01/13/22 1549   BP: 122/70   Site: Right Lower Arm   Position: Sitting   Cuff Size: Medium Adult   Pulse: 80   Temp: 98 °F (36.7 °C)   TempSrc: Infrared   SpO2: 98%   Weight: (!) 312 lb (141.5 kg)       Physical exam:  Physical Exam  Vitals reviewed. Constitutional:       General: She is not in acute distress. Appearance: She is well-developed. HENT:      Head: Normocephalic and atraumatic. Right Ear: Tympanic membrane, ear canal and external ear normal. Tympanic membrane is not erythematous. Tympanic membrane has normal mobility. Left Ear: Tympanic membrane, ear canal and external ear normal. Tympanic membrane is not erythematous. Tympanic membrane has normal mobility. Nose: Nose normal.      Mouth/Throat:      Pharynx: No oropharyngeal exudate. Neck:      Thyroid: No thyromegaly. Cardiovascular:      Rate and Rhythm: Normal rate and regular rhythm. Heart sounds: Normal heart sounds. No murmur heard. Pulmonary:      Effort: Pulmonary effort is normal. No respiratory distress. Breath sounds: Normal breath sounds. No wheezing. Abdominal:      General: Bowel sounds are normal. There is no distension. Palpations: Abdomen is soft. Tenderness: There is no abdominal tenderness. There is no guarding or rebound. Musculoskeletal:      Cervical back: Normal range of motion. Lymphadenopathy:      Cervical: No cervical adenopathy. Skin:     General: Skin is warm and dry. Neurological:      Mental Status: She is alert and oriented to person, place, and time. Psychiatric:         Behavior: Behavior normal.         Assessment/Plan:  25 y.o. female here mainly for DUB:  - DUB: will continue the depo shot  - SOB: combination of deconditioning and still recovering from COVID  - Mood: restarting nighttime trazodone     Diagnosis Orders   1. History of COVID-19     2.  Depression, unspecified depression type  traZODone (DESYREL) 50 MG tablet   3. DUB (dysfunctional uterine bleeding)          Return if symptoms worsen or fail to improve.     Destiny Rojas MD

## 2022-04-14 ENCOUNTER — NURSE ONLY (OUTPATIENT)
Dept: FAMILY MEDICINE CLINIC | Age: 22
End: 2022-04-14
Payer: COMMERCIAL

## 2022-04-14 DIAGNOSIS — N93.8 DUB (DYSFUNCTIONAL UTERINE BLEEDING): Primary | ICD-10-CM

## 2022-04-14 PROCEDURE — 96372 THER/PROPH/DIAG INJ SC/IM: CPT | Performed by: FAMILY MEDICINE

## 2022-04-14 RX ORDER — MEDROXYPROGESTERONE ACETATE 150 MG/ML
150 INJECTION, SUSPENSION INTRAMUSCULAR ONCE
Status: COMPLETED | OUTPATIENT
Start: 2022-04-14 | End: 2022-04-14

## 2022-04-14 RX ADMIN — MEDROXYPROGESTERONE ACETATE 150 MG: 150 INJECTION, SUSPENSION INTRAMUSCULAR at 14:48

## 2022-04-28 ENCOUNTER — HOSPITAL ENCOUNTER (OUTPATIENT)
Age: 22
Setting detail: SPECIMEN
Discharge: HOME | End: 2022-04-28
Payer: COMMERCIAL

## 2022-04-28 ENCOUNTER — OFFICE VISIT (OUTPATIENT)
Dept: FAMILY MEDICINE CLINIC | Age: 22
End: 2022-04-28
Payer: COMMERCIAL

## 2022-04-28 VITALS
TEMPERATURE: 97.9 F | DIASTOLIC BLOOD PRESSURE: 88 MMHG | OXYGEN SATURATION: 97 % | WEIGHT: 293 LBS | SYSTOLIC BLOOD PRESSURE: 122 MMHG | HEART RATE: 88 BPM | BODY MASS INDEX: 50.36 KG/M2

## 2022-04-28 DIAGNOSIS — F41.8 SITUATIONAL ANXIETY: ICD-10-CM

## 2022-04-28 DIAGNOSIS — E66.01 CLASS 3 SEVERE OBESITY DUE TO EXCESS CALORIES WITH SERIOUS COMORBIDITY AND BODY MASS INDEX (BMI) OF 40.0 TO 44.9 IN ADULT (HCC): ICD-10-CM

## 2022-04-28 DIAGNOSIS — K21.9 GASTROESOPHAGEAL REFLUX DISEASE, UNSPECIFIED WHETHER ESOPHAGITIS PRESENT: Primary | ICD-10-CM

## 2022-04-28 LAB — TSH REFLEX: 2.08 UIU/ML (ref 0.44–3.86)

## 2022-04-28 PROCEDURE — 36415 COLL VENOUS BLD VENIPUNCTURE: CPT | Performed by: FAMILY MEDICINE

## 2022-04-28 PROCEDURE — 84443 ASSAY THYROID STIM HORMONE: CPT

## 2022-04-28 PROCEDURE — 99214 OFFICE O/P EST MOD 30 MIN: CPT | Performed by: FAMILY MEDICINE

## 2022-04-28 RX ORDER — ESOMEPRAZOLE MAGNESIUM 40 MG/1
40 CAPSULE, DELAYED RELEASE ORAL DAILY
Qty: 30 CAPSULE | Refills: 11 | Status: SHIPPED | OUTPATIENT
Start: 2022-04-28 | End: 2022-06-16 | Stop reason: SDUPTHER

## 2022-04-28 RX ORDER — PROPRANOLOL HYDROCHLORIDE 20 MG/1
20 TABLET ORAL 3 TIMES DAILY PRN
Qty: 30 TABLET | Refills: 0 | Status: SHIPPED | OUTPATIENT
Start: 2022-04-28 | End: 2022-05-06

## 2022-04-28 RX ORDER — MEDROXYPROGESTERONE ACETATE 150 MG/ML
150 INJECTION, SUSPENSION INTRAMUSCULAR
COMMUNITY

## 2022-04-28 ASSESSMENT — ENCOUNTER SYMPTOMS
ABDOMINAL PAIN: 0
COUGH: 0
SHORTNESS OF BREATH: 0
DIARRHEA: 0
RHINORRHEA: 0
CONSTIPATION: 0
SORE THROAT: 0
WHEEZING: 0

## 2022-04-28 NOTE — PROGRESS NOTES
[address] PRIMARY CARE  [address] [address]  Dept: [phone]  Dept Fax: [phone]  Loc: [phone]     Chief Complaint  Chief Complaint   Patient presents with    Anxiety     discuss medication, is going back to school    Discuss Labs     thyroid, has been working out everyday for the past 4 months    Heartburn     discuss medication, uses tums and nexium        HPI:  25 y. o.female who presents for the following:      GERD: all day for years; TUMS not helpful; nexium is too expensive but seems to help    Obesity: has been going to gym daily x2mo with cardio and changed her diet but not losing weight; wants to get the thyroid check due to this    Anxiety: going back to school for sonography; gets nervous for tests and can freeze up; avoids confrontation; interested in propranolol. Review of Systems   Constitutional: Negative for chills and fever. HENT: Negative for congestion, rhinorrhea and sore throat. Respiratory: Negative for cough, shortness of breath and wheezing. Gastrointestinal: Negative for abdominal pain, constipation and diarrhea. Endocrine: Negative for polydipsia and polyuria. Genitourinary: Negative for dysuria, frequency and urgency. Neurological: Negative for syncope, light-headedness, numbness and headaches. Psychiatric/Behavioral: Negative for sleep disturbance. The patient is nervous/anxious.         Past Medical History:   Diagnosis Date    COVID-19     12/23/2021     Past Surgical History:   Procedure Laterality Date    TONSILLECTOMY AND ADENOIDECTOMY      WISDOM TOOTH EXTRACTION       Social History     Socioeconomic History    Marital status: Single     Spouse name: Not on file    Number of children: Not on file    Years of education: Not on file    Highest education level: Not on file   Occupational History    Not on file   Tobacco Use    Smoking status: Passive Smoke Exposure - Never Smoker    Smokeless tobacco: Never Used   Substance and Sexual Activity    Alcohol use: No    Drug use: No    Sexual activity: Never   Other Topics Concern    Not on file   Social History Narrative    Not on file     Social Determinants of Health     Financial Resource Strain: Low Risk     Difficulty of Paying Living Expenses: Not hard at all   Food Insecurity: No Food Insecurity    Worried About Running Out of Food in the Last Year: Never true    Joni of Food in the Last Year: Never true   Transportation Needs:     Lack of Transportation (Medical): Not on file    Lack of Transportation (Non-Medical): Not on file   Physical Activity:     Days of Exercise per Week: Not on file    Minutes of Exercise per Session: Not on file   Stress:     Feeling of Stress : Not on file   Social Connections:     Frequency of Communication with Friends and Family: Not on file    Frequency of Social Gatherings with Friends and Family: Not on file    Attends [employer] Services: Not on file    Active Member of [address] [place] [address] or Organizations: Not on file    Attends Club or Organization Meetings: Not on file    Marital Status: Not on file   Intimate Partner Violence:     Fear of Current or Ex-Partner: Not on file    Emotionally Abused: Not on file    Physically Abused: Not on file    Sexually Abused: Not on file   Housing Stability:     Unable to Pay for Housing in the Last Year: Not on file    Number of [place] in the Last Year: Not on file    Unstable Housing in the Last Year: Not on file     No family history on file.    Allergies   Allergen Reactions    Bee Venom Swelling     Current Outpatient Medications   Medication Sig Dispense Refill    medroxyPROGESTERone (DEPO-PROVERA) 150 MG/ML injection Inject 150 mg into the muscle every 3 months      esomeprazole (NEXIUM) 40 MG delayed release capsule Take 1 capsule by mouth daily 30 capsule 11    propranolol (INDERAL) 20 MG tablet Take 1 tablet by mouth 3 times daily as needed (situational anxiety) 30 tablet 0    traZODone (DESYREL) 50 MG tablet take 1 tablet by mouth NIGHTLY (Patient not taking: Reported on 4/28/2022) 90 tablet 3     No current facility-administered medications for this visit. Vitals:    04/28/22 1522   BP: 122/88   Site: Left Upper Arm   Position: Sitting   Cuff Size: Large Adult   Pulse: 88   Temp: 97.9 °F (36.6 °C)   TempSrc: Infrared   SpO2: 97%   Weight: (!) 312 lb (141.5 kg)       Physical exam:  Physical Exam  Vitals reviewed. Constitutional:       General: She is not in acute distress. Appearance: She is well-developed. HENT:      Head: Normocephalic and atraumatic. Cardiovascular:      Rate and Rhythm: Normal rate. Pulmonary:      Effort: Pulmonary effort is normal. No respiratory distress. Musculoskeletal:      Cervical back: Normal range of motion. Skin:     General: Skin is warm and dry. Neurological:      Mental Status: She is alert and oriented to person, place, and time. Psychiatric:         Behavior: Behavior normal.         Assessment/Plan:  25 y.o. female here mainly for mood:  - Anxiety: prn propranolol for testing  - Obesity: unlikely related to the thyroid but we can check it; discussed that despite the results, wt loss is challenging; discussed options for meds or nutrition referral; she will think on this and we may discuss med options in the near future  - GERD: ordered nexium; would benefit from wt loss     Diagnosis Orders   1. Gastroesophageal reflux disease, unspecified whether esophagitis present  esomeprazole (NEXIUM) 40 MG delayed release capsule   2. Class 3 severe obesity due to excess calories with serious comorbidity and body mass index (BMI) of 40.0 to 44.9 in adult (HCC)  TSH with Reflex   3. Situational anxiety  propranolol (INDERAL) 20 MG tablet        Return if symptoms worsen or fail to improve.     Abhijit Denise MD

## 2022-05-06 DIAGNOSIS — F41.8 SITUATIONAL ANXIETY: ICD-10-CM

## 2022-05-06 RX ORDER — PROPRANOLOL HYDROCHLORIDE 20 MG/1
TABLET ORAL
Qty: 30 TABLET | Refills: 0 | Status: SHIPPED | OUTPATIENT
Start: 2022-05-06 | End: 2022-06-16 | Stop reason: SDUPTHER

## 2022-05-06 NOTE — TELEPHONE ENCOUNTER
Comments:     Last Office Visit (last PCP visit):   4/28/2022    Next Visit Date:  Future Appointments   Date Time Provider Brittney Antonio   6/30/2022  2:00 PM SCHEDULE, [address]       **If hasn't been seen in over a year OR hasn't followed up according to last diabetes/ADHD visit, make appointment for patient before sending refill to provider.     Rx requested:  Requested Prescriptions     Pending Prescriptions Disp Refills    propranolol (INDERAL) 20 MG tablet [Pharmacy Med Name: PROPRANOLOL 20 MG TABLET] 30 tablet 0     Sig: take 1 tablet by mouth three times a day if needed

## 2022-06-16 DIAGNOSIS — F41.8 SITUATIONAL ANXIETY: ICD-10-CM

## 2022-06-16 DIAGNOSIS — K21.9 GASTROESOPHAGEAL REFLUX DISEASE, UNSPECIFIED WHETHER ESOPHAGITIS PRESENT: ICD-10-CM

## 2022-06-16 DIAGNOSIS — F32.A DEPRESSION, UNSPECIFIED DEPRESSION TYPE: ICD-10-CM

## 2022-06-16 RX ORDER — PROPRANOLOL HYDROCHLORIDE 20 MG/1
20 TABLET ORAL 3 TIMES DAILY PRN
Qty: 30 TABLET | Refills: 3 | Status: SHIPPED | OUTPATIENT
Start: 2022-06-16

## 2022-06-16 RX ORDER — TRAZODONE HYDROCHLORIDE 50 MG/1
TABLET ORAL
Qty: 90 TABLET | Refills: 3 | Status: SHIPPED | OUTPATIENT
Start: 2022-06-16

## 2022-06-16 RX ORDER — ESOMEPRAZOLE MAGNESIUM 40 MG/1
40 CAPSULE, DELAYED RELEASE ORAL DAILY
Qty: 30 CAPSULE | Refills: 11 | Status: SHIPPED | OUTPATIENT
Start: 2022-06-16

## 2022-06-16 NOTE — TELEPHONE ENCOUNTER
Comments:     Last Office Visit (last PCP visit):   4/28/2022    Next Visit Date:  Future Appointments   Date Time Provider Brittney Antonio   6/30/2022  2:00 PM SCHEDULE, [address]       **If hasn't been seen in over a year OR hasn't followed up according to last diabetes/ADHD visit, make appointment for patient before sending refill to provider.     Rx requested:  Requested Prescriptions     Pending Prescriptions Disp Refills    traZODone (DESYREL) 50 MG tablet 90 tablet 3     Sig: take 1 tablet by mouth NIGHTLY    esomeprazole (NEXIUM) 40 MG delayed release capsule 30 capsule 11     Sig: Take 1 capsule by mouth daily    propranolol (INDERAL) 20 MG tablet 30 tablet 0

## 2022-06-30 ENCOUNTER — NURSE ONLY (OUTPATIENT)
Dept: FAMILY MEDICINE CLINIC | Age: 22
End: 2022-06-30
Payer: COMMERCIAL

## 2022-06-30 DIAGNOSIS — N93.8 DUB (DYSFUNCTIONAL UTERINE BLEEDING): Primary | ICD-10-CM

## 2022-06-30 PROCEDURE — 96372 THER/PROPH/DIAG INJ SC/IM: CPT | Performed by: FAMILY MEDICINE

## 2022-06-30 RX ORDER — MEDROXYPROGESTERONE ACETATE 150 MG/ML
150 INJECTION, SUSPENSION INTRAMUSCULAR ONCE
Status: COMPLETED | OUTPATIENT
Start: 2022-06-30 | End: 2022-06-30

## 2022-06-30 RX ADMIN — MEDROXYPROGESTERONE ACETATE 150 MG: 150 INJECTION, SUSPENSION INTRAMUSCULAR at 13:56

## 2022-09-27 ENCOUNTER — NURSE ONLY (OUTPATIENT)
Dept: FAMILY MEDICINE CLINIC | Age: 22
End: 2022-09-27
Payer: COMMERCIAL

## 2022-09-27 DIAGNOSIS — N93.8 DUB (DYSFUNCTIONAL UTERINE BLEEDING): Primary | ICD-10-CM

## 2022-09-27 PROCEDURE — 96372 THER/PROPH/DIAG INJ SC/IM: CPT | Performed by: FAMILY MEDICINE

## 2022-09-27 RX ORDER — MEDROXYPROGESTERONE ACETATE 150 MG/ML
150 INJECTION, SUSPENSION INTRAMUSCULAR ONCE
Status: COMPLETED | OUTPATIENT
Start: 2022-09-27 | End: 2022-09-27

## 2022-09-27 RX ADMIN — MEDROXYPROGESTERONE ACETATE 150 MG: 150 INJECTION, SUSPENSION INTRAMUSCULAR at 14:48

## 2022-12-15 ENCOUNTER — NURSE ONLY (OUTPATIENT)
Dept: FAMILY MEDICINE CLINIC | Age: 22
End: 2022-12-15

## 2022-12-15 VITALS — TEMPERATURE: 97.4 F

## 2022-12-15 DIAGNOSIS — N93.8 DUB (DYSFUNCTIONAL UTERINE BLEEDING): Primary | ICD-10-CM

## 2022-12-15 RX ORDER — MEDROXYPROGESTERONE ACETATE 150 MG/ML
150 INJECTION, SUSPENSION INTRAMUSCULAR ONCE
Status: COMPLETED | OUTPATIENT
Start: 2022-12-15 | End: 2022-12-15

## 2022-12-15 RX ADMIN — MEDROXYPROGESTERONE ACETATE 150 MG: 150 INJECTION, SUSPENSION INTRAMUSCULAR at 16:21

## 2022-12-15 NOTE — PROGRESS NOTES
Patient is here today for her depo injection. Patient receive it in her left glute and tolerated the procedure well.

## 2023-03-02 ENCOUNTER — NURSE ONLY (OUTPATIENT)
Dept: FAMILY MEDICINE CLINIC | Age: 23
End: 2023-03-02

## 2023-03-02 DIAGNOSIS — N93.8 DUB (DYSFUNCTIONAL UTERINE BLEEDING): Primary | ICD-10-CM

## 2023-03-02 RX ORDER — MEDROXYPROGESTERONE ACETATE 150 MG/ML
150 INJECTION, SUSPENSION INTRAMUSCULAR ONCE
Status: COMPLETED | OUTPATIENT
Start: 2023-03-02 | End: 2023-03-02

## 2023-03-02 RX ADMIN — MEDROXYPROGESTERONE ACETATE 150 MG: 150 INJECTION, SUSPENSION INTRAMUSCULAR at 14:44

## 2023-03-09 ENCOUNTER — OFFICE VISIT (OUTPATIENT)
Dept: FAMILY MEDICINE CLINIC | Age: 23
End: 2023-03-09

## 2023-03-09 VITALS
HEIGHT: 66 IN | DIASTOLIC BLOOD PRESSURE: 82 MMHG | HEART RATE: 88 BPM | TEMPERATURE: 97.3 F | BODY MASS INDEX: 47.09 KG/M2 | WEIGHT: 293 LBS | OXYGEN SATURATION: 99 % | SYSTOLIC BLOOD PRESSURE: 128 MMHG

## 2023-03-09 DIAGNOSIS — R21 RASH AND NONSPECIFIC SKIN ERUPTION: Primary | ICD-10-CM

## 2023-03-09 SDOH — ECONOMIC STABILITY: FOOD INSECURITY: WITHIN THE PAST 12 MONTHS, THE FOOD YOU BOUGHT JUST DIDN'T LAST AND YOU DIDN'T HAVE MONEY TO GET MORE.: NEVER TRUE

## 2023-03-09 SDOH — ECONOMIC STABILITY: FOOD INSECURITY: WITHIN THE PAST 12 MONTHS, YOU WORRIED THAT YOUR FOOD WOULD RUN OUT BEFORE YOU GOT MONEY TO BUY MORE.: NEVER TRUE

## 2023-03-09 SDOH — ECONOMIC STABILITY: HOUSING INSECURITY
IN THE LAST 12 MONTHS, WAS THERE A TIME WHEN YOU DID NOT HAVE A STEADY PLACE TO SLEEP OR SLEPT IN A SHELTER (INCLUDING NOW)?: NO

## 2023-03-09 SDOH — ECONOMIC STABILITY: INCOME INSECURITY: HOW HARD IS IT FOR YOU TO PAY FOR THE VERY BASICS LIKE FOOD, HOUSING, MEDICAL CARE, AND HEATING?: NOT HARD AT ALL

## 2023-03-09 ASSESSMENT — ENCOUNTER SYMPTOMS
DIARRHEA: 0
COUGH: 0
WHEEZING: 0
CONSTIPATION: 0
SHORTNESS OF BREATH: 0
ABDOMINAL PAIN: 0
RHINORRHEA: 0
SORE THROAT: 0

## 2023-03-09 ASSESSMENT — PATIENT HEALTH QUESTIONNAIRE - PHQ9
SUM OF ALL RESPONSES TO PHQ QUESTIONS 1-9: 0
2. FEELING DOWN, DEPRESSED OR HOPELESS: 0
1. LITTLE INTEREST OR PLEASURE IN DOING THINGS: 0
SUM OF ALL RESPONSES TO PHQ QUESTIONS 1-9: 0
SUM OF ALL RESPONSES TO PHQ QUESTIONS 1-9: 0
3. TROUBLE FALLING OR STAYING ASLEEP: 0
SUM OF ALL RESPONSES TO PHQ QUESTIONS 1-9: 0
SUM OF ALL RESPONSES TO PHQ9 QUESTIONS 1 & 2: 0
4. FEELING TIRED OR HAVING LITTLE ENERGY: 0
9. THOUGHTS THAT YOU WOULD BE BETTER OFF DEAD, OR OF HURTING YOURSELF: 0
7. TROUBLE CONCENTRATING ON THINGS, SUCH AS READING THE NEWSPAPER OR WATCHING TELEVISION: 0
5. POOR APPETITE OR OVEREATING: 0
10. IF YOU CHECKED OFF ANY PROBLEMS, HOW DIFFICULT HAVE THESE PROBLEMS MADE IT FOR YOU TO DO YOUR WORK, TAKE CARE OF THINGS AT HOME, OR GET ALONG WITH OTHER PEOPLE: 0
8. MOVING OR SPEAKING SO SLOWLY THAT OTHER PEOPLE COULD HAVE NOTICED. OR THE OPPOSITE, BEING SO FIGETY OR RESTLESS THAT YOU HAVE BEEN MOVING AROUND A LOT MORE THAN USUAL: 0
6. FEELING BAD ABOUT YOURSELF - OR THAT YOU ARE A FAILURE OR HAVE LET YOURSELF OR YOUR FAMILY DOWN: 0

## 2023-03-09 NOTE — PROGRESS NOTES
6901 [facility] 1840 [facility] PRIMARY CARE  [address] [place] [address]  Dept: [phone]  Dept Fax: [phone]  Loc: [phone]     Chief Complaint  Chief Complaint   Patient presents with    Leg Problem     Right leg has a purple silvia on it       HPI:  21 y. o.female who presents for the following:      RLE problem: RLE is bigger than the LLE; has red marks on the leg all her life and wants to know what this is; in school to be an [employer]    Review of Systems   Constitutional:  Negative for chills and fever. HENT:  Negative for congestion, rhinorrhea and sore throat. Respiratory:  Negative for cough, shortness of breath and wheezing. Cardiovascular:  Positive for leg swelling. Gastrointestinal:  Negative for abdominal pain, constipation and diarrhea. Endocrine: Negative for polydipsia and polyuria. Genitourinary:  Negative for dysuria, frequency and urgency. Skin:  Positive for rash. Neurological:  Negative for syncope, light-headedness, numbness and headaches. Psychiatric/Behavioral:  Negative for sleep disturbance. The patient is not nervous/anxious.       Past Medical History:   Diagnosis Date    COVID-19     12/23/2021     Past Surgical History:   Procedure Laterality Date    TONSILLECTOMY AND ADENOIDECTOMY      WISDOM TOOTH EXTRACTION       Social History     Socioeconomic History    Marital status: Single     Spouse name: Not on file    Number of children: Not on file    Years of education: Not on file    Highest education level: Not on file   Occupational History    Not on file   Tobacco Use    Smoking status: Never     Passive exposure: Yes    Smokeless tobacco: Never   Substance and Sexual Activity    Alcohol use: No    Drug use: No    Sexual activity: Never   Other Topics Concern    Not on file   Social History Narrative    Not on file     Social Determinants of Health     Financial Resource Strain: Low Risk Difficulty of Paying Living Expenses: Not hard at all   Food Insecurity: No Food Insecurity    Worried About [address] [employer] in the Last Year: Never true    Ran Out of Food in the Last Year: Never true   Transportation Needs: Unknown    Lack of Transportation (Medical): Not on file    Lack of Transportation (Non-Medical): No   Physical Activity: Not on file   Stress: Not on file   Social Connections: Not on file   Intimate Partner Violence: Not on file   Housing Stability: Unknown    Unable to Pay for Housing in the Last Year: Not on file    Number of Places Lived in the Last Year: Not on file    Unstable Housing in the Last Year: No     No family history on file. Allergies   Allergen Reactions    Bee Venom Swelling     Current Outpatient Medications   Medication Sig Dispense Refill    esomeprazole (NEXIUM) 40 MG delayed release capsule Take 1 capsule by mouth daily 30 capsule 11    propranolol (INDERAL) 20 MG tablet Take 1 tablet by mouth 3 times daily as needed (anxiety) 30 tablet 3    medroxyPROGESTERone (DEPO-PROVERA) 150 MG/ML injection Inject 150 mg into the muscle every 3 months      traZODone (DESYREL) 50 MG tablet take 1 tablet by mouth NIGHTLY (Patient not taking: Reported on 3/9/2023) 90 tablet 3     No current facility-administered medications for this visit. Vitals:    03/09/23 1433   BP: 128/82   Pulse: 88   Temp: 97.3 °F (36.3 °C)   TempSrc: Infrared   SpO2: 99%   Weight: (!) 312 lb (141.5 kg)   Height: 5' 6\" (1.676 m)       Physical exam:  Physical Exam  Vitals reviewed. Constitutional:       General: She is not in acute distress. Appearance: She is well-developed. HENT:      Head: Normocephalic and atraumatic. Cardiovascular:      Rate and Rhythm: Normal rate. Pulmonary:      Effort: Pulmonary effort is normal. No respiratory distress. Musculoskeletal:      Cervical back: Normal range of motion. Skin:     General: Skin is warm and dry.    Neurological:      Mental Status: She is alert and oriented to person, place, and time. Psychiatric:         Attention and Perception: Attention normal.         Behavior: Behavior normal.       Assessment/Plan:  21 y.o. female here mainly for LE problem:  - LE skin discoloration; possibly hemangioma or birth silvia; also has follicle infections; no pitting edema so likely just fatty tissue unevenly placed in the legs; sending to derm for opinion     Diagnosis Orders   1. Rash and nonspecific skin eruption  Amb External Referral To Dermatology           Return if symptoms worsen or fail to improve.     Rhea Villa MD

## 2023-05-19 ENCOUNTER — NURSE ONLY (OUTPATIENT)
Dept: FAMILY MEDICINE CLINIC | Age: 23
End: 2023-05-19
Payer: COMMERCIAL

## 2023-05-19 DIAGNOSIS — N93.8 DUB (DYSFUNCTIONAL UTERINE BLEEDING): Primary | ICD-10-CM

## 2023-05-19 PROCEDURE — 96372 THER/PROPH/DIAG INJ SC/IM: CPT | Performed by: FAMILY MEDICINE

## 2023-05-19 RX ORDER — MEDROXYPROGESTERONE ACETATE 150 MG/ML
150 INJECTION, SUSPENSION INTRAMUSCULAR ONCE
Status: COMPLETED | OUTPATIENT
Start: 2023-05-19 | End: 2023-05-19

## 2023-05-19 RX ADMIN — MEDROXYPROGESTERONE ACETATE 150 MG: 150 INJECTION, SUSPENSION INTRAMUSCULAR at 16:11

## 2023-05-25 RX ORDER — EPINEPHRINE 0.3 MG/.3ML
INJECTION SUBCUTANEOUS
OUTPATIENT
Start: 2023-05-25

## 2023-06-01 ENCOUNTER — OFFICE VISIT (OUTPATIENT)
Dept: FAMILY MEDICINE CLINIC | Age: 23
End: 2023-06-01
Payer: COMMERCIAL

## 2023-06-01 VITALS
BODY MASS INDEX: 47.09 KG/M2 | OXYGEN SATURATION: 98 % | HEIGHT: 66 IN | WEIGHT: 293 LBS | DIASTOLIC BLOOD PRESSURE: 78 MMHG | HEART RATE: 86 BPM | SYSTOLIC BLOOD PRESSURE: 120 MMHG | TEMPERATURE: 97.3 F

## 2023-06-01 DIAGNOSIS — Z91.030 BEE STING ALLERGY: Primary | ICD-10-CM

## 2023-06-01 PROCEDURE — 99213 OFFICE O/P EST LOW 20 MIN: CPT | Performed by: FAMILY MEDICINE

## 2023-06-01 RX ORDER — EPINEPHRINE 0.3 MG/.3ML
0.3 INJECTION SUBCUTANEOUS ONCE
Qty: 0.3 ML | Refills: 1 | Status: SHIPPED | OUTPATIENT
Start: 2023-06-01 | End: 2023-06-01

## 2023-06-01 ASSESSMENT — ENCOUNTER SYMPTOMS
RHINORRHEA: 0
COUGH: 0
ABDOMINAL PAIN: 0
WHEEZING: 0
CONSTIPATION: 0
SORE THROAT: 0
DIARRHEA: 0
SHORTNESS OF BREATH: 0

## 2023-06-01 NOTE — PROGRESS NOTES
690 [facility] 1840 [facility] PRIMARY CARE  [address] [place] [address]  Dept: [phone]  Dept Fax: [phone]: [phone]     Chief Complaint  Chief Complaint   Patient presents with    Discuss Medications     Patient would like to talk about getting an Epi Pen       HPI:  21 y. o.female who presents for the following:      Hx of bee sting allergy; got stung in 5th grade and had hives but hasn't been stung since; no trouble swallowing or breathing at the time; would like a epipen as she would like to go to the zoo but worried about going outside    Review of Systems   Constitutional:  Negative for chills and fever. HENT:  Negative for congestion, rhinorrhea and sore throat. Respiratory:  Negative for cough, shortness of breath and wheezing. Gastrointestinal:  Negative for abdominal pain, constipation and diarrhea. Endocrine: Negative for polydipsia and polyuria. Genitourinary:  Negative for dysuria, frequency and urgency. Neurological:  Negative for syncope, light-headedness, numbness and headaches. Psychiatric/Behavioral:  Negative for sleep disturbance. The patient is not nervous/anxious.       Past Medical History:   Diagnosis Date    COVID-19     12/23/2021     Past Surgical History:   Procedure Laterality Date    TONSILLECTOMY AND ADENOIDECTOMY      WISDOM TOOTH EXTRACTION       Social History     Socioeconomic History    Marital status: Single     Spouse name: Not on file    Number of children: Not on file    Years of education: Not on file    Highest education level: Not on file   Occupational History    Not on file   Tobacco Use    Smoking status: Never     Passive exposure: Yes    Smokeless tobacco: Never   Substance and Sexual Activity    Alcohol use: No    Drug use: No    Sexual activity: Never   Other Topics Concern    Not on file   Social History Narrative    Not on file     Social Determinants of Health

## 2023-08-11 ENCOUNTER — NURSE ONLY (OUTPATIENT)
Dept: INTERNAL MEDICINE | Age: 23
End: 2023-08-11
Payer: COMMERCIAL

## 2023-08-11 DIAGNOSIS — N93.8 DUB (DYSFUNCTIONAL UTERINE BLEEDING): Primary | ICD-10-CM

## 2023-08-11 PROCEDURE — 96372 THER/PROPH/DIAG INJ SC/IM: CPT | Performed by: FAMILY MEDICINE

## 2023-08-11 RX ORDER — MEDROXYPROGESTERONE ACETATE 150 MG/ML
150 INJECTION, SUSPENSION INTRAMUSCULAR ONCE
Status: COMPLETED | OUTPATIENT
Start: 2023-08-11 | End: 2023-08-11

## 2023-08-11 RX ADMIN — MEDROXYPROGESTERONE ACETATE 150 MG: 150 INJECTION, SUSPENSION INTRAMUSCULAR at 10:23

## 2023-08-11 NOTE — PROGRESS NOTES
After obtaining consent, and per orders of Dr. Harshal Gooden, injection of Depoprovera given in Right upper quad. gluteus by Sixto Vicente MA. Patient instructed to remain in clinic for 20 minutes afterwards, and to report any adverse reaction to me immediately.

## 2023-11-06 ENCOUNTER — NURSE ONLY (OUTPATIENT)
Dept: FAMILY MEDICINE CLINIC | Age: 23
End: 2023-11-06
Payer: COMMERCIAL

## 2023-11-06 DIAGNOSIS — N93.8 DUB (DYSFUNCTIONAL UTERINE BLEEDING): Primary | ICD-10-CM

## 2023-11-06 PROCEDURE — 96372 THER/PROPH/DIAG INJ SC/IM: CPT | Performed by: FAMILY MEDICINE

## 2023-11-06 RX ORDER — MEDROXYPROGESTERONE ACETATE 150 MG/ML
150 INJECTION, SUSPENSION INTRAMUSCULAR ONCE
Status: COMPLETED | OUTPATIENT
Start: 2023-11-06 | End: 2023-11-06

## 2023-11-06 RX ADMIN — MEDROXYPROGESTERONE ACETATE 150 MG: 150 INJECTION, SUSPENSION INTRAMUSCULAR at 13:13

## 2023-11-06 NOTE — PROGRESS NOTES
Patient here for Depo-Provera injection, tolerated procedure well. Given in left dorsogluteal muscle. Will return as directed for next injection if applicable.

## 2024-01-22 ENCOUNTER — OFFICE VISIT (OUTPATIENT)
Dept: FAMILY MEDICINE CLINIC | Age: 24
End: 2024-01-22
Payer: COMMERCIAL

## 2024-01-22 ENCOUNTER — TELEPHONE (OUTPATIENT)
Dept: INTERNAL MEDICINE | Age: 24
End: 2024-01-22

## 2024-01-22 VITALS
TEMPERATURE: 97.1 F | HEART RATE: 70 BPM | WEIGHT: 293 LBS | SYSTOLIC BLOOD PRESSURE: 122 MMHG | DIASTOLIC BLOOD PRESSURE: 82 MMHG | BODY MASS INDEX: 51.17 KG/M2 | OXYGEN SATURATION: 95 %

## 2024-01-22 DIAGNOSIS — Z13.39 ADHD (ATTENTION DEFICIT HYPERACTIVITY DISORDER) EVALUATION: ICD-10-CM

## 2024-01-22 DIAGNOSIS — N93.8 DUB (DYSFUNCTIONAL UTERINE BLEEDING): Primary | ICD-10-CM

## 2024-01-22 PROCEDURE — 99214 OFFICE O/P EST MOD 30 MIN: CPT | Performed by: FAMILY MEDICINE

## 2024-01-22 RX ORDER — LEVONORGESTREL AND ETHINYL ESTRADIOL 0.15-0.03
1 KIT ORAL DAILY
Qty: 1 PACKET | Refills: 3 | Status: SHIPPED | OUTPATIENT
Start: 2024-01-22

## 2024-01-22 ASSESSMENT — PATIENT HEALTH QUESTIONNAIRE - PHQ9
SUM OF ALL RESPONSES TO PHQ QUESTIONS 1-9: 0
SUM OF ALL RESPONSES TO PHQ QUESTIONS 1-9: 0
SUM OF ALL RESPONSES TO PHQ9 QUESTIONS 1 & 2: 0
1. LITTLE INTEREST OR PLEASURE IN DOING THINGS: 0
SUM OF ALL RESPONSES TO PHQ QUESTIONS 1-9: 0
2. FEELING DOWN, DEPRESSED OR HOPELESS: 0
SUM OF ALL RESPONSES TO PHQ QUESTIONS 1-9: 0

## 2024-01-22 ASSESSMENT — ENCOUNTER SYMPTOMS
RHINORRHEA: 0
CONSTIPATION: 0
SHORTNESS OF BREATH: 0
SORE THROAT: 0
DIARRHEA: 0
WHEEZING: 0
COUGH: 0
ABDOMINAL PAIN: 0

## 2024-01-22 NOTE — TELEPHONE ENCOUNTER
----- Message from Sarah Salas sent at 1/22/2024 10:13 AM EST -----  Subject: Appointment Request    Reason for Call:     QUESTIONS    Reason for appointment request? No appointments available during search     Additional Information for Provider? Patient was given a referral to see   Dr. England Psychologist/Psycharist. Dr. Byers gave her the referral. Please   call patient to schedule.  ---------------------------------------------------------------------------  --------------  CALL BACK INFO  [phone]; OK to leave message on voicemail  ---------------------------------------------------------------------------  --------------  SCRIPT ANSWERS

## 2024-01-22 NOTE — PROGRESS NOTES
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   HENT:  Negative for congestion, rhinorrhea and sore throat.    Respiratory:  Negative for cough, shortness of breath and wheezing.    Gastrointestinal:  Negative for abdominal pain, constipation and diarrhea.   Endocrine: Negative for polydipsia and polyuria.   Genitourinary:  Negative for dysuria, frequency and urgency.   Neurological:  Negative for syncope, light-headedness, numbness and headaches.   Psychiatric/Behavioral:  Negative for sleep disturbance. The patient is not nervous/anxious.        Past Medical History:   Diagnosis Date    COVID-19     12/23/2021     Past Surgical History:   Procedure Laterality Date    TONSILLECTOMY AND ADENOIDECTOMY      WISDOM TOOTH EXTRACTION       Social History     Socioeconomic History    Marital status: Single     Spouse name: Not on file    Number of children: Not on file    Years of education: Not on file    Highest education level: Not on file   Occupational History    Not on file   Tobacco Use    Smoking status: Never     Passive exposure: Yes    Smokeless tobacco: Never   Substance and Sexual Activity    Alcohol use: No    Drug use: No    Sexual activity: Never   Other Topics

## 2024-03-11 NOTE — PROGRESS NOTES
Patient is here today in the office for her depo injection. Pt received it in her left glut and tolerated the procedure well.
138/POCT Blood Glucose

## 2024-12-13 ENCOUNTER — HOSPITAL ENCOUNTER (EMERGENCY)
Facility: HOSPITAL | Age: 24
Discharge: HOME | End: 2024-12-13
Payer: COMMERCIAL

## 2024-12-13 VITALS
RESPIRATION RATE: 16 BRPM | DIASTOLIC BLOOD PRESSURE: 91 MMHG | SYSTOLIC BLOOD PRESSURE: 148 MMHG | HEART RATE: 70 BPM | OXYGEN SATURATION: 100 % | BODY MASS INDEX: 45.48 KG/M2 | HEIGHT: 66 IN | TEMPERATURE: 98.4 F | WEIGHT: 283 LBS

## 2024-12-13 DIAGNOSIS — M54.50 ACUTE BILATERAL LOW BACK PAIN WITHOUT SCIATICA: Primary | ICD-10-CM

## 2024-12-13 PROCEDURE — 99283 EMERGENCY DEPT VISIT LOW MDM: CPT

## 2024-12-13 PROCEDURE — 2500000001 HC RX 250 WO HCPCS SELF ADMINISTERED DRUGS (ALT 637 FOR MEDICARE OP): Performed by: NURSE PRACTITIONER

## 2024-12-13 RX ORDER — IBUPROFEN 600 MG/1
600 TABLET ORAL ONCE
Status: COMPLETED | OUTPATIENT
Start: 2024-12-13 | End: 2024-12-13

## 2024-12-13 RX ORDER — NAPROXEN 500 MG/1
500 TABLET ORAL 2 TIMES DAILY PRN
Qty: 20 TABLET | Refills: 0 | Status: SHIPPED | OUTPATIENT
Start: 2024-12-13

## 2024-12-13 RX ORDER — CYCLOBENZAPRINE HCL 10 MG
10 TABLET ORAL 3 TIMES DAILY PRN
Qty: 15 TABLET | Refills: 0 | Status: SHIPPED | OUTPATIENT
Start: 2024-12-13

## 2024-12-13 ASSESSMENT — COLUMBIA-SUICIDE SEVERITY RATING SCALE - C-SSRS
6. HAVE YOU EVER DONE ANYTHING, STARTED TO DO ANYTHING, OR PREPARED TO DO ANYTHING TO END YOUR LIFE?: NO
1. IN THE PAST MONTH, HAVE YOU WISHED YOU WERE DEAD OR WISHED YOU COULD GO TO SLEEP AND NOT WAKE UP?: NO
2. HAVE YOU ACTUALLY HAD ANY THOUGHTS OF KILLING YOURSELF?: NO

## 2024-12-13 ASSESSMENT — PAIN DESCRIPTION - PAIN TYPE: TYPE: ACUTE PAIN

## 2024-12-13 ASSESSMENT — PAIN DESCRIPTION - LOCATION: LOCATION: BACK

## 2024-12-13 ASSESSMENT — PAIN DESCRIPTION - DESCRIPTORS: DESCRIPTORS: TIGHTNESS

## 2024-12-13 ASSESSMENT — PAIN DESCRIPTION - ORIENTATION: ORIENTATION: LOWER

## 2024-12-13 ASSESSMENT — PAIN - FUNCTIONAL ASSESSMENT: PAIN_FUNCTIONAL_ASSESSMENT: 0-10

## 2024-12-13 ASSESSMENT — PAIN SCALES - GENERAL
PAINLEVEL_OUTOF10: 8
PAINLEVEL_OUTOF10: 8

## 2024-12-13 ASSESSMENT — PAIN DESCRIPTION - FREQUENCY: FREQUENCY: CONSTANT/CONTINUOUS

## 2024-12-13 NOTE — ED PROVIDER NOTES
HPI   Chief Complaint   Patient presents with    Back Pain     Back pain and some spasms       Patient is a healthy nontoxic-appearing 24-year-old female with past medical history of nonsuppurative otitis media, presents the emergency today for complaint of back pain.  Patient states within the past several hours she developed lower back discomfort.  Patient denies any trauma or falls.  Patient denies any loss of bowel or bladder control, urinary complaints, fever, shaking, or chills.  Patient states she has tried taking Tylenol at home for pain however has not been helping.  Patient denies any radiation to the abdomen.  Patient denies any abdominal pain, nausea vomit, diarrhea or constipation, chest pain, shortness breath difficulty breathing, fever, shaking, or chills.              Patient History   History reviewed. No pertinent past medical history.  History reviewed. No pertinent surgical history.  No family history on file.  Social History     Tobacco Use    Smoking status: Not on file    Smokeless tobacco: Not on file   Substance Use Topics    Alcohol use: Not on file    Drug use: Not on file       Physical Exam   ED Triage Vitals [12/13/24 0227]   Temperature Heart Rate Respirations BP   36.9 °C (98.4 °F) 70 16 (!) 148/91      Pulse Ox Temp Source Heart Rate Source Patient Position   100 % Temporal Monitor Sitting      BP Location FiO2 (%)     Right arm --       Physical Exam  Vitals and nursing note reviewed.   Constitutional:       General: She is not in acute distress.     Appearance: Normal appearance. She is not ill-appearing, toxic-appearing or diaphoretic.   HENT:      Head: Normocephalic.   Eyes:      General:         Right eye: No discharge.         Left eye: No discharge.      Extraocular Movements: Extraocular movements intact.      Pupils: Pupils are equal, round, and reactive to light.   Cardiovascular:      Rate and Rhythm: Normal rate and regular rhythm.      Pulses: Normal pulses.      Heart  sounds: Normal heart sounds. No murmur heard.     No friction rub. No gallop.   Pulmonary:      Effort: Pulmonary effort is normal. No respiratory distress.      Breath sounds: Normal breath sounds. No stridor. No wheezing, rhonchi or rales.   Chest:      Chest wall: No tenderness.   Musculoskeletal:         General: Tenderness present. No swelling, deformity or signs of injury. Normal range of motion.      Cervical back: Normal range of motion and neck supple.      Right lower leg: No edema.      Left lower leg: No edema.      Comments: Reproducible tenderness upon palpation of the bilateral lower lumbar back no midline lumbar spinal tenderness no crepitus, step-offs upon palpation, negative straight leg exam, able to flex and stent toes no difficulty, able to run heel from knee to ankle any difficulty, no saddle paresthesia, no loss of bowel or bladder control, independently ambulatory any difficulty.   Skin:     General: Skin is warm.      Capillary Refill: Capillary refill takes less than 2 seconds.      Coloration: Skin is not jaundiced or pale.      Findings: No bruising, erythema, lesion or rash.   Neurological:      General: No focal deficit present.      Mental Status: She is alert and oriented to person, place, and time.           ED Course & MDM   Diagnoses as of 12/13/24 0249   Acute bilateral low back pain without sciatica                 No data recorded     Della Coma Scale Score: 15 (12/13/24 0226 : Leeanna Abrams RN)                           Medical Decision Making  Given patient's complaint presentation a thorough exam was performed.  On exam patient has Reproducible tenderness upon palpation of the bilateral lower lumbar back no midline lumbar spinal tenderness no crepitus, step-offs upon palpation, negative straight leg exam, able to flex and stent toes no difficulty, able to run heel from knee to ankle any difficulty, no saddle paresthesia, no loss of bowel or bladder control,  independently ambulatory any difficulty.  Remains hemodynamically stable during emergency evaluation, is afebrile, have a low suspicion for epidural abscess, cauda equina syndrome.  Given patient's pain is reproducible and worse with movement I do suspect patient is experiencing musculoskeletal spasm.  Patient received ibuprofen in the emergency room.  Patient received prescription for naproxen and Flexeril.  I encouraged monitoring symptoms, if they become worse return to emergency room for further evaluation, otherwise follow primary care provider as needed.  Patient was agreeable this plan and discharged home in stable condition.    JENNIFER Vera     Portions of this note were generated using digital voice recognition software, and may contain grammatical errors      Procedure  Procedures     JENNIFER Vera  12/13/24 4500

## 2024-12-15 ENCOUNTER — HOSPITAL ENCOUNTER (EMERGENCY)
Age: 24
Discharge: ANOTHER HOSPITAL | End: 2024-12-16
Attending: EMERGENCY MEDICINE
Payer: COMMERCIAL

## 2024-12-15 ENCOUNTER — APPOINTMENT (OUTPATIENT)
Dept: CT IMAGING | Age: 24
End: 2024-12-15
Payer: COMMERCIAL

## 2024-12-15 VITALS
RESPIRATION RATE: 18 BRPM | BODY MASS INDEX: 51.17 KG/M2 | HEART RATE: 68 BPM | DIASTOLIC BLOOD PRESSURE: 72 MMHG | OXYGEN SATURATION: 99 % | SYSTOLIC BLOOD PRESSURE: 109 MMHG | TEMPERATURE: 98.3 F | WEIGHT: 293 LBS

## 2024-12-15 DIAGNOSIS — R74.01 TRANSAMINITIS: ICD-10-CM

## 2024-12-15 DIAGNOSIS — K80.20 CHOLELITHIASIS WITHOUT CHOLECYSTITIS: Primary | ICD-10-CM

## 2024-12-15 DIAGNOSIS — E80.6 HYPERBILIRUBINEMIA: ICD-10-CM

## 2024-12-15 LAB
ALBUMIN SERPL-MCNC: 4.2 G/DL (ref 3.5–4.6)
ALP SERPL-CCNC: 295 U/L (ref 40–130)
ALT SERPL-CCNC: 526 U/L (ref 0–33)
ANION GAP SERPL CALCULATED.3IONS-SCNC: 14 MEQ/L (ref 9–15)
AST SERPL-CCNC: 426 U/L (ref 0–35)
BACTERIA URNS QL MICRO: ABNORMAL /HPF
BASOPHILS # BLD: 0.1 K/UL (ref 0–0.1)
BASOPHILS NFR BLD: 0.9 % (ref 0.1–1.2)
BILIRUB SERPL-MCNC: 4.8 MG/DL (ref 0.2–0.7)
BILIRUB UR QL STRIP: ABNORMAL
BUN SERPL-MCNC: 8 MG/DL (ref 6–20)
CALCIUM SERPL-MCNC: 10.2 MG/DL (ref 8.5–9.9)
CHLORIDE SERPL-SCNC: 101 MEQ/L (ref 95–107)
CK SERPL-CCNC: 142 U/L (ref 0–170)
CLARITY UR: CLEAR
CO2 SERPL-SCNC: 24 MEQ/L (ref 20–31)
COLOR UR: ABNORMAL
CREAT SERPL-MCNC: 0.49 MG/DL (ref 0.5–0.9)
EOSINOPHIL # BLD: 0.2 K/UL (ref 0–0.4)
EOSINOPHIL NFR BLD: 2.4 % (ref 0.7–5.8)
EPI CELLS #/AREA URNS HPF: ABNORMAL /HPF
ERYTHROCYTE [DISTWIDTH] IN BLOOD BY AUTOMATED COUNT: 12.7 % (ref 11.7–14.4)
GLOBULIN SER CALC-MCNC: 4 G/DL (ref 2.3–3.5)
GLUCOSE SERPL-MCNC: 93 MG/DL (ref 70–99)
GLUCOSE UR STRIP-MCNC: ABNORMAL MG/DL
HCG UR QL: NEGATIVE
HCT VFR BLD AUTO: 47.8 % (ref 37–47)
HGB BLD-MCNC: 15.5 G/DL (ref 11.2–15.7)
HGB UR QL STRIP: ABNORMAL
IMM GRANULOCYTES # BLD: 0 K/UL
IMM GRANULOCYTES NFR BLD: 0.1 %
KETONES UR STRIP-MCNC: ABNORMAL MG/DL
LEUKOCYTE ESTERASE UR QL STRIP: ABNORMAL
LIPASE SERPL-CCNC: 17 U/L (ref 12–95)
LYMPHOCYTES # BLD: 2.1 K/UL (ref 1.2–3.7)
LYMPHOCYTES NFR BLD: 24.4 %
MAGNESIUM SERPL-MCNC: 2.1 MG/DL (ref 1.7–2.4)
MCH RBC QN AUTO: 29.2 PG (ref 25.6–32.2)
MCHC RBC AUTO-ENTMCNC: 32.4 % (ref 32.2–35.5)
MCV RBC AUTO: 90 FL (ref 79.4–94.8)
MONOCYTES # BLD: 0.6 K/UL (ref 0.2–0.9)
MONOCYTES NFR BLD: 6.6 % (ref 4.7–12.5)
NEUTROPHILS # BLD: 5.5 K/UL (ref 1.6–6.1)
NEUTS SEG NFR BLD: 65.6 % (ref 34–71.1)
NITRITE UR QL STRIP: ABNORMAL
PH UR STRIP: ABNORMAL [PH] (ref 5–9)
PLATELET # BLD AUTO: 353 K/UL (ref 182–369)
POTASSIUM SERPL-SCNC: 3.9 MEQ/L (ref 3.4–4.9)
PROT SERPL-MCNC: 8.2 G/DL (ref 6.3–8)
PROT UR STRIP-MCNC: ABNORMAL MG/DL
RBC # BLD AUTO: 5.31 M/UL (ref 3.93–5.22)
RBC #/AREA URNS HPF: ABNORMAL /HPF (ref 0–2)
SODIUM SERPL-SCNC: 139 MEQ/L (ref 135–144)
SP GR UR STRIP: ABNORMAL (ref 1–1.03)
UROBILINOGEN UR STRIP-ACNC: ABNORMAL E.U./DL
WBC # BLD AUTO: 8.4 K/UL (ref 4–10)
WBC #/AREA URNS HPF: ABNORMAL /HPF (ref 0–5)

## 2024-12-15 PROCEDURE — 96361 HYDRATE IV INFUSION ADD-ON: CPT

## 2024-12-15 PROCEDURE — 6360000004 HC RX CONTRAST MEDICATION: Performed by: EMERGENCY MEDICINE

## 2024-12-15 PROCEDURE — 85025 COMPLETE CBC W/AUTO DIFF WBC: CPT

## 2024-12-15 PROCEDURE — 2580000003 HC RX 258: Performed by: EMERGENCY MEDICINE

## 2024-12-15 PROCEDURE — 99285 EMERGENCY DEPT VISIT HI MDM: CPT

## 2024-12-15 PROCEDURE — 36415 COLL VENOUS BLD VENIPUNCTURE: CPT

## 2024-12-15 PROCEDURE — 84703 CHORIONIC GONADOTROPIN ASSAY: CPT

## 2024-12-15 PROCEDURE — 6360000002 HC RX W HCPCS: Performed by: EMERGENCY MEDICINE

## 2024-12-15 PROCEDURE — 82248 BILIRUBIN DIRECT: CPT

## 2024-12-15 PROCEDURE — 82550 ASSAY OF CK (CPK): CPT

## 2024-12-15 PROCEDURE — 80074 ACUTE HEPATITIS PANEL: CPT

## 2024-12-15 PROCEDURE — 80053 COMPREHEN METABOLIC PANEL: CPT

## 2024-12-15 PROCEDURE — 96374 THER/PROPH/DIAG INJ IV PUSH: CPT

## 2024-12-15 PROCEDURE — 83690 ASSAY OF LIPASE: CPT

## 2024-12-15 PROCEDURE — 82247 BILIRUBIN TOTAL: CPT

## 2024-12-15 PROCEDURE — 83735 ASSAY OF MAGNESIUM: CPT

## 2024-12-15 PROCEDURE — 74177 CT ABD & PELVIS W/CONTRAST: CPT

## 2024-12-15 PROCEDURE — 81001 URINALYSIS AUTO W/SCOPE: CPT

## 2024-12-15 RX ORDER — ACETAMINOPHEN 500 MG
1000 TABLET ORAL
Status: DISCONTINUED | OUTPATIENT
Start: 2024-12-15 | End: 2024-12-15

## 2024-12-15 RX ORDER — NAPROXEN 250 MG/1
250 TABLET ORAL 2 TIMES DAILY WITH MEALS
Status: ON HOLD | COMMUNITY
End: 2024-12-18 | Stop reason: HOSPADM

## 2024-12-15 RX ORDER — KETOROLAC TROMETHAMINE 30 MG/ML
30 INJECTION, SOLUTION INTRAMUSCULAR; INTRAVENOUS ONCE
Status: COMPLETED | OUTPATIENT
Start: 2024-12-15 | End: 2024-12-15

## 2024-12-15 RX ORDER — IOPAMIDOL 755 MG/ML
75 INJECTION, SOLUTION INTRAVASCULAR
Status: COMPLETED | OUTPATIENT
Start: 2024-12-15 | End: 2024-12-15

## 2024-12-15 RX ORDER — 0.9 % SODIUM CHLORIDE 0.9 %
1000 INTRAVENOUS SOLUTION INTRAVENOUS ONCE
Status: COMPLETED | OUTPATIENT
Start: 2024-12-15 | End: 2024-12-15

## 2024-12-15 RX ORDER — CYCLOBENZAPRINE HCL 5 MG
5 TABLET ORAL 3 TIMES DAILY PRN
Status: ON HOLD | COMMUNITY
End: 2024-12-18 | Stop reason: HOSPADM

## 2024-12-15 RX ORDER — ONDANSETRON 4 MG/1
4 TABLET, FILM COATED ORAL EVERY 8 HOURS PRN
Qty: 20 TABLET | Refills: 0 | Status: ON HOLD | OUTPATIENT
Start: 2024-12-15 | End: 2024-12-18 | Stop reason: HOSPADM

## 2024-12-15 RX ORDER — KETOROLAC TROMETHAMINE 10 MG/1
10 TABLET, FILM COATED ORAL EVERY 6 HOURS PRN
Qty: 20 TABLET | Refills: 0 | Status: ON HOLD | OUTPATIENT
Start: 2024-12-15 | End: 2024-12-18 | Stop reason: HOSPADM

## 2024-12-15 RX ADMIN — KETOROLAC TROMETHAMINE 30 MG: 30 INJECTION, SOLUTION INTRAMUSCULAR at 21:26

## 2024-12-15 RX ADMIN — IOPAMIDOL 75 ML: 755 INJECTION, SOLUTION INTRAVENOUS at 22:09

## 2024-12-15 RX ADMIN — SODIUM CHLORIDE 1000 ML: 9 INJECTION, SOLUTION INTRAVENOUS at 21:26

## 2024-12-15 ASSESSMENT — PAIN DESCRIPTION - ORIENTATION: ORIENTATION: RIGHT;LEFT

## 2024-12-15 ASSESSMENT — LIFESTYLE VARIABLES
HOW MANY STANDARD DRINKS CONTAINING ALCOHOL DO YOU HAVE ON A TYPICAL DAY: PATIENT DOES NOT DRINK
HOW OFTEN DO YOU HAVE A DRINK CONTAINING ALCOHOL: NEVER

## 2024-12-15 ASSESSMENT — PAIN SCALES - GENERAL: PAINLEVEL_OUTOF10: 7

## 2024-12-15 ASSESSMENT — PAIN DESCRIPTION - LOCATION: LOCATION: BACK

## 2024-12-15 ASSESSMENT — PAIN - FUNCTIONAL ASSESSMENT: PAIN_FUNCTIONAL_ASSESSMENT: 0-10

## 2024-12-16 ENCOUNTER — APPOINTMENT (OUTPATIENT)
Dept: ULTRASOUND IMAGING | Age: 24
DRG: 412 | End: 2024-12-16
Attending: INTERNAL MEDICINE
Payer: COMMERCIAL

## 2024-12-16 ENCOUNTER — HOSPITAL ENCOUNTER (INPATIENT)
Age: 24
LOS: 2 days | Discharge: HOME | DRG: 412 | End: 2024-12-18
Attending: INTERNAL MEDICINE | Admitting: INTERNAL MEDICINE
Payer: COMMERCIAL

## 2024-12-16 ENCOUNTER — PREP FOR PROCEDURE (OUTPATIENT)
Dept: SURGERY | Age: 24
End: 2024-12-16

## 2024-12-16 ENCOUNTER — APPOINTMENT (OUTPATIENT)
Dept: GENERAL RADIOLOGY | Age: 24
DRG: 412 | End: 2024-12-16
Attending: INTERNAL MEDICINE
Payer: COMMERCIAL

## 2024-12-16 ENCOUNTER — ANESTHESIA (OUTPATIENT)
Dept: OPERATING ROOM | Age: 24
DRG: 412 | End: 2024-12-16
Payer: COMMERCIAL

## 2024-12-16 ENCOUNTER — ANESTHESIA EVENT (OUTPATIENT)
Dept: OPERATING ROOM | Age: 24
DRG: 412 | End: 2024-12-16
Payer: COMMERCIAL

## 2024-12-16 DIAGNOSIS — K81.0 ACUTE CHOLECYSTITIS: ICD-10-CM

## 2024-12-16 PROBLEM — K80.50 CHOLEDOCHOLITHIASIS: Status: ACTIVE | Noted: 2024-12-16

## 2024-12-16 LAB
ALBUMIN SERPL-MCNC: 3.6 G/DL (ref 3.5–4.6)
ALP SERPL-CCNC: 262 U/L (ref 40–130)
ALT SERPL-CCNC: 434 U/L (ref 0–33)
ANION GAP SERPL CALCULATED.3IONS-SCNC: 11 MEQ/L (ref 9–15)
AST SERPL-CCNC: 280 U/L (ref 0–35)
BASOPHILS # BLD: 0.1 K/UL (ref 0–0.2)
BASOPHILS NFR BLD: 1 %
BILIRUB DIRECT SERPL-MCNC: 3.8 MG/DL (ref 0–0.4)
BILIRUB INDIRECT SERPL-MCNC: 1.1 MG/DL (ref 0–0.6)
BILIRUB SERPL-MCNC: 4.2 MG/DL (ref 0.2–0.7)
BILIRUB SERPL-MCNC: 4.9 MG/DL (ref 0.2–0.7)
BUN SERPL-MCNC: 7 MG/DL (ref 6–20)
CALCIUM SERPL-MCNC: 9.1 MG/DL (ref 8.5–9.9)
CHLORIDE SERPL-SCNC: 105 MEQ/L (ref 95–107)
CO2 SERPL-SCNC: 23 MEQ/L (ref 20–31)
CREAT SERPL-MCNC: 0.45 MG/DL (ref 0.5–0.9)
EOSINOPHIL # BLD: 0.2 K/UL (ref 0–0.7)
EOSINOPHIL NFR BLD: 3.3 %
ERYTHROCYTE [DISTWIDTH] IN BLOOD BY AUTOMATED COUNT: 12.8 % (ref 11.5–14.5)
GLOBULIN SER CALC-MCNC: 2.9 G/DL (ref 2.3–3.5)
GLUCOSE SERPL-MCNC: 101 MG/DL (ref 70–99)
HAV IGM SER IA-ACNC: NONREACTIVE
HCT VFR BLD AUTO: 40.2 % (ref 37–47)
HEP B S AGB SURF AG: NONREACTIVE
HEPATITIS B CORE IGM ANTIBODY: NONREACTIVE
HEPATITIS C ANTIBODY: NONREACTIVE
HGB BLD-MCNC: 13.2 G/DL (ref 12–16)
INR PPP: 1.1
LYMPHOCYTES # BLD: 2.4 K/UL (ref 1–4.8)
LYMPHOCYTES NFR BLD: 33.6 %
MCH RBC QN AUTO: 29.3 PG (ref 27–31.3)
MCHC RBC AUTO-ENTMCNC: 32.8 % (ref 33–37)
MCV RBC AUTO: 89.1 FL (ref 79.4–94.8)
MONOCYTES # BLD: 0.5 K/UL (ref 0.2–0.8)
MONOCYTES NFR BLD: 7.2 %
NEUTROPHILS # BLD: 3.9 K/UL (ref 1.4–6.5)
NEUTS SEG NFR BLD: 54.6 %
PLATELET # BLD AUTO: 325 K/UL (ref 130–400)
POTASSIUM SERPL-SCNC: 4 MEQ/L (ref 3.4–4.9)
PROT SERPL-MCNC: 6.5 G/DL (ref 6.3–8)
PROTHROMBIN TIME: 14.1 SEC (ref 12.3–14.9)
RBC # BLD AUTO: 4.51 M/UL (ref 4.2–5.4)
SODIUM SERPL-SCNC: 139 MEQ/L (ref 135–144)
WBC # BLD AUTO: 7.2 K/UL (ref 4.8–10.8)

## 2024-12-16 PROCEDURE — 6360000004 HC RX CONTRAST MEDICATION: Performed by: COLON & RECTAL SURGERY

## 2024-12-16 PROCEDURE — 85025 COMPLETE CBC W/AUTO DIFF WBC: CPT

## 2024-12-16 PROCEDURE — 97161 PT EVAL LOW COMPLEX 20 MIN: CPT

## 2024-12-16 PROCEDURE — 2580000003 HC RX 258: Performed by: INTERNAL MEDICINE

## 2024-12-16 PROCEDURE — 2500000003 HC RX 250 WO HCPCS: Performed by: ANESTHESIOLOGIST ASSISTANT

## 2024-12-16 PROCEDURE — 2580000003 HC RX 258: Performed by: COLON & RECTAL SURGERY

## 2024-12-16 PROCEDURE — 1210000000 HC MED SURG R&B

## 2024-12-16 PROCEDURE — 3600000014 HC SURGERY LEVEL 4 ADDTL 15MIN: Performed by: COLON & RECTAL SURGERY

## 2024-12-16 PROCEDURE — 6370000000 HC RX 637 (ALT 250 FOR IP)

## 2024-12-16 PROCEDURE — 6360000002 HC RX W HCPCS: Performed by: INTERNAL MEDICINE

## 2024-12-16 PROCEDURE — C1757 CATH, THROMBECTOMY/EMBOLECT: HCPCS | Performed by: COLON & RECTAL SURGERY

## 2024-12-16 PROCEDURE — A4217 STERILE WATER/SALINE, 500 ML: HCPCS | Performed by: COLON & RECTAL SURGERY

## 2024-12-16 PROCEDURE — 6360000002 HC RX W HCPCS: Performed by: COLON & RECTAL SURGERY

## 2024-12-16 PROCEDURE — C1758 CATHETER, URETERAL: HCPCS | Performed by: COLON & RECTAL SURGERY

## 2024-12-16 PROCEDURE — 85610 PROTHROMBIN TIME: CPT

## 2024-12-16 PROCEDURE — 3600000004 HC SURGERY LEVEL 4 BASE: Performed by: COLON & RECTAL SURGERY

## 2024-12-16 PROCEDURE — 7100000000 HC PACU RECOVERY - FIRST 15 MIN: Performed by: COLON & RECTAL SURGERY

## 2024-12-16 PROCEDURE — 7100000001 HC PACU RECOVERY - ADDTL 15 MIN: Performed by: COLON & RECTAL SURGERY

## 2024-12-16 PROCEDURE — 99223 1ST HOSP IP/OBS HIGH 75: CPT | Performed by: NURSE PRACTITIONER

## 2024-12-16 PROCEDURE — 64488 TAP BLOCK BI INJECTION: CPT | Performed by: STUDENT IN AN ORGANIZED HEALTH CARE EDUCATION/TRAINING PROGRAM

## 2024-12-16 PROCEDURE — 36415 COLL VENOUS BLD VENIPUNCTURE: CPT

## 2024-12-16 PROCEDURE — 76705 ECHO EXAM OF ABDOMEN: CPT

## 2024-12-16 PROCEDURE — 3E0T3BZ INTRODUCTION OF ANESTHETIC AGENT INTO PERIPHERAL NERVES AND PLEXI, PERCUTANEOUS APPROACH: ICD-10-PCS | Performed by: INTERNAL MEDICINE

## 2024-12-16 PROCEDURE — 47564 LAPARO CHOLECYSTECTOMY/EXPLR: CPT | Performed by: COLON & RECTAL SURGERY

## 2024-12-16 PROCEDURE — 3700000000 HC ANESTHESIA ATTENDED CARE: Performed by: COLON & RECTAL SURGERY

## 2024-12-16 PROCEDURE — 2709999900 HC NON-CHARGEABLE SUPPLY: Performed by: COLON & RECTAL SURGERY

## 2024-12-16 PROCEDURE — 88304 TISSUE EXAM BY PATHOLOGIST: CPT

## 2024-12-16 PROCEDURE — C1747 HC ENDOSCOPE, SINGLE, URINARY TRACT: HCPCS | Performed by: COLON & RECTAL SURGERY

## 2024-12-16 PROCEDURE — 6360000002 HC RX W HCPCS

## 2024-12-16 PROCEDURE — 2720000010 HC SURG SUPPLY STERILE: Performed by: COLON & RECTAL SURGERY

## 2024-12-16 PROCEDURE — 74300 X-RAY BILE DUCTS/PANCREAS: CPT

## 2024-12-16 PROCEDURE — 2500000003 HC RX 250 WO HCPCS

## 2024-12-16 PROCEDURE — 80053 COMPREHEN METABOLIC PANEL: CPT

## 2024-12-16 PROCEDURE — 0FJB4ZZ INSPECTION OF HEPATOBILIARY DUCT, PERCUTANEOUS ENDOSCOPIC APPROACH: ICD-10-PCS | Performed by: INTERNAL MEDICINE

## 2024-12-16 PROCEDURE — 0FT44ZZ RESECTION OF GALLBLADDER, PERCUTANEOUS ENDOSCOPIC APPROACH: ICD-10-PCS | Performed by: INTERNAL MEDICINE

## 2024-12-16 PROCEDURE — 6360000002 HC RX W HCPCS: Performed by: ANESTHESIOLOGIST ASSISTANT

## 2024-12-16 PROCEDURE — 6360000002 HC RX W HCPCS: Performed by: STUDENT IN AN ORGANIZED HEALTH CARE EDUCATION/TRAINING PROGRAM

## 2024-12-16 PROCEDURE — 3700000001 HC ADD 15 MINUTES (ANESTHESIA): Performed by: COLON & RECTAL SURGERY

## 2024-12-16 RX ORDER — POLYETHYLENE GLYCOL 3350 17 G/17G
17 POWDER, FOR SOLUTION ORAL DAILY PRN
Status: DISCONTINUED | OUTPATIENT
Start: 2024-12-16 | End: 2024-12-18 | Stop reason: HOSPADM

## 2024-12-16 RX ORDER — MEPERIDINE HYDROCHLORIDE 25 MG/ML
12.5 INJECTION INTRAMUSCULAR; INTRAVENOUS; SUBCUTANEOUS EVERY 5 MIN PRN
Status: DISCONTINUED | OUTPATIENT
Start: 2024-12-16 | End: 2024-12-16 | Stop reason: HOSPADM

## 2024-12-16 RX ORDER — ONDANSETRON 2 MG/ML
4 INJECTION INTRAMUSCULAR; INTRAVENOUS EVERY 6 HOURS PRN
Status: DISCONTINUED | OUTPATIENT
Start: 2024-12-16 | End: 2024-12-18 | Stop reason: HOSPADM

## 2024-12-16 RX ORDER — SODIUM CHLORIDE 0.9 % (FLUSH) 0.9 %
5-40 SYRINGE (ML) INJECTION EVERY 12 HOURS SCHEDULED
Status: DISCONTINUED | OUTPATIENT
Start: 2024-12-16 | End: 2024-12-18 | Stop reason: HOSPADM

## 2024-12-16 RX ORDER — MAGNESIUM SULFATE IN WATER 40 MG/ML
2000 INJECTION, SOLUTION INTRAVENOUS PRN
Status: DISCONTINUED | OUTPATIENT
Start: 2024-12-16 | End: 2024-12-18 | Stop reason: HOSPADM

## 2024-12-16 RX ORDER — ONDANSETRON 2 MG/ML
INJECTION INTRAMUSCULAR; INTRAVENOUS
Status: DISCONTINUED | OUTPATIENT
Start: 2024-12-16 | End: 2024-12-16 | Stop reason: SDUPTHER

## 2024-12-16 RX ORDER — MAGNESIUM HYDROXIDE 1200 MG/15ML
LIQUID ORAL CONTINUOUS PRN
Status: DISCONTINUED | OUTPATIENT
Start: 2024-12-16 | End: 2024-12-16 | Stop reason: HOSPADM

## 2024-12-16 RX ORDER — SODIUM CHLORIDE 0.9 % (FLUSH) 0.9 %
5-40 SYRINGE (ML) INJECTION EVERY 12 HOURS SCHEDULED
Status: DISCONTINUED | OUTPATIENT
Start: 2024-12-16 | End: 2024-12-16 | Stop reason: HOSPADM

## 2024-12-16 RX ORDER — SODIUM CHLORIDE 9 MG/ML
INJECTION, SOLUTION INTRAVENOUS PRN
Status: CANCELLED | OUTPATIENT
Start: 2024-12-16

## 2024-12-16 RX ORDER — LABETALOL HYDROCHLORIDE 5 MG/ML
10 INJECTION, SOLUTION INTRAVENOUS
Status: DISCONTINUED | OUTPATIENT
Start: 2024-12-16 | End: 2024-12-16 | Stop reason: HOSPADM

## 2024-12-16 RX ORDER — ENOXAPARIN SODIUM 100 MG/ML
30 INJECTION SUBCUTANEOUS 2 TIMES DAILY
Status: DISCONTINUED | OUTPATIENT
Start: 2024-12-16 | End: 2024-12-18 | Stop reason: HOSPADM

## 2024-12-16 RX ORDER — DEXAMETHASONE SODIUM PHOSPHATE 10 MG/ML
INJECTION INTRAMUSCULAR; INTRAVENOUS
Status: DISCONTINUED | OUTPATIENT
Start: 2024-12-16 | End: 2024-12-16 | Stop reason: SDUPTHER

## 2024-12-16 RX ORDER — IOPAMIDOL 408 MG/ML
INJECTION, SOLUTION INTRATHECAL PRN
Status: DISCONTINUED | OUTPATIENT
Start: 2024-12-16 | End: 2024-12-16 | Stop reason: HOSPADM

## 2024-12-16 RX ORDER — SODIUM CHLORIDE 9 MG/ML
INJECTION, SOLUTION INTRAVENOUS PRN
Status: DISCONTINUED | OUTPATIENT
Start: 2024-12-16 | End: 2024-12-18 | Stop reason: HOSPADM

## 2024-12-16 RX ORDER — SODIUM CHLORIDE 9 MG/ML
INJECTION, SOLUTION INTRAVENOUS PRN
Status: DISCONTINUED | OUTPATIENT
Start: 2024-12-16 | End: 2024-12-16 | Stop reason: HOSPADM

## 2024-12-16 RX ORDER — FENTANYL CITRATE 50 UG/ML
INJECTION, SOLUTION INTRAMUSCULAR; INTRAVENOUS
Status: DISCONTINUED | OUTPATIENT
Start: 2024-12-16 | End: 2024-12-16 | Stop reason: SDUPTHER

## 2024-12-16 RX ORDER — FENTANYL CITRATE 0.05 MG/ML
50 INJECTION, SOLUTION INTRAMUSCULAR; INTRAVENOUS EVERY 5 MIN PRN
Status: DISCONTINUED | OUTPATIENT
Start: 2024-12-16 | End: 2024-12-16 | Stop reason: HOSPADM

## 2024-12-16 RX ORDER — FENTANYL CITRATE 0.05 MG/ML
25 INJECTION, SOLUTION INTRAMUSCULAR; INTRAVENOUS EVERY 5 MIN PRN
Status: DISCONTINUED | OUTPATIENT
Start: 2024-12-16 | End: 2024-12-16 | Stop reason: HOSPADM

## 2024-12-16 RX ORDER — ACETAMINOPHEN 325 MG/1
650 TABLET ORAL EVERY 6 HOURS PRN
Status: DISCONTINUED | OUTPATIENT
Start: 2024-12-16 | End: 2024-12-18 | Stop reason: HOSPADM

## 2024-12-16 RX ORDER — ROCURONIUM BROMIDE 10 MG/ML
INJECTION, SOLUTION INTRAVENOUS
Status: DISCONTINUED | OUTPATIENT
Start: 2024-12-16 | End: 2024-12-16 | Stop reason: SDUPTHER

## 2024-12-16 RX ORDER — SODIUM CHLORIDE 9 MG/ML
INJECTION, SOLUTION INTRAVENOUS CONTINUOUS
Status: DISCONTINUED | OUTPATIENT
Start: 2024-12-16 | End: 2024-12-17

## 2024-12-16 RX ORDER — KETOROLAC TROMETHAMINE 30 MG/ML
30 INJECTION, SOLUTION INTRAMUSCULAR; INTRAVENOUS EVERY 6 HOURS PRN
Status: DISCONTINUED | OUTPATIENT
Start: 2024-12-16 | End: 2024-12-18 | Stop reason: HOSPADM

## 2024-12-16 RX ORDER — PROPOFOL 10 MG/ML
INJECTION, EMULSION INTRAVENOUS
Status: DISCONTINUED | OUTPATIENT
Start: 2024-12-16 | End: 2024-12-16 | Stop reason: SDUPTHER

## 2024-12-16 RX ORDER — ONDANSETRON 4 MG/1
4 TABLET, ORALLY DISINTEGRATING ORAL EVERY 8 HOURS PRN
Status: DISCONTINUED | OUTPATIENT
Start: 2024-12-16 | End: 2024-12-18 | Stop reason: HOSPADM

## 2024-12-16 RX ORDER — MIDAZOLAM HYDROCHLORIDE 1 MG/ML
INJECTION, SOLUTION INTRAMUSCULAR; INTRAVENOUS
Status: DISCONTINUED | OUTPATIENT
Start: 2024-12-16 | End: 2024-12-16 | Stop reason: SDUPTHER

## 2024-12-16 RX ORDER — HYDROMORPHONE HYDROCHLORIDE 1 MG/ML
1 INJECTION, SOLUTION INTRAMUSCULAR; INTRAVENOUS; SUBCUTANEOUS
Status: DISCONTINUED | OUTPATIENT
Start: 2024-12-16 | End: 2024-12-18 | Stop reason: HOSPADM

## 2024-12-16 RX ORDER — OXYCODONE HYDROCHLORIDE 5 MG/1
5 TABLET ORAL PRN
Status: DISCONTINUED | OUTPATIENT
Start: 2024-12-16 | End: 2024-12-16 | Stop reason: HOSPADM

## 2024-12-16 RX ORDER — ACETAMINOPHEN 650 MG/1
650 SUPPOSITORY RECTAL EVERY 6 HOURS PRN
Status: DISCONTINUED | OUTPATIENT
Start: 2024-12-16 | End: 2024-12-18 | Stop reason: HOSPADM

## 2024-12-16 RX ORDER — BUPIVACAINE HYDROCHLORIDE 5 MG/ML
INJECTION, SOLUTION EPIDURAL; INTRACAUDAL
Status: COMPLETED | OUTPATIENT
Start: 2024-12-16 | End: 2024-12-16

## 2024-12-16 RX ORDER — SODIUM CHLORIDE 0.9 % (FLUSH) 0.9 %
5-40 SYRINGE (ML) INJECTION PRN
Status: DISCONTINUED | OUTPATIENT
Start: 2024-12-16 | End: 2024-12-16 | Stop reason: HOSPADM

## 2024-12-16 RX ORDER — SODIUM CHLORIDE 0.9 % (FLUSH) 0.9 %
5-40 SYRINGE (ML) INJECTION PRN
Status: CANCELLED | OUTPATIENT
Start: 2024-12-16

## 2024-12-16 RX ORDER — NALOXONE HYDROCHLORIDE 0.4 MG/ML
INJECTION, SOLUTION INTRAMUSCULAR; INTRAVENOUS; SUBCUTANEOUS PRN
Status: DISCONTINUED | OUTPATIENT
Start: 2024-12-16 | End: 2024-12-16 | Stop reason: HOSPADM

## 2024-12-16 RX ORDER — SODIUM CHLORIDE 0.9 % (FLUSH) 0.9 %
5-40 SYRINGE (ML) INJECTION EVERY 12 HOURS SCHEDULED
Status: CANCELLED | OUTPATIENT
Start: 2024-12-16

## 2024-12-16 RX ORDER — HYDRALAZINE HYDROCHLORIDE 20 MG/ML
10 INJECTION INTRAMUSCULAR; INTRAVENOUS
Status: DISCONTINUED | OUTPATIENT
Start: 2024-12-16 | End: 2024-12-16 | Stop reason: HOSPADM

## 2024-12-16 RX ORDER — PROCHLORPERAZINE EDISYLATE 5 MG/ML
5 INJECTION INTRAMUSCULAR; INTRAVENOUS
Status: DISCONTINUED | OUTPATIENT
Start: 2024-12-16 | End: 2024-12-16 | Stop reason: HOSPADM

## 2024-12-16 RX ORDER — LIDOCAINE HYDROCHLORIDE 10 MG/ML
INJECTION, SOLUTION EPIDURAL; INFILTRATION; INTRACAUDAL; PERINEURAL
Status: DISCONTINUED | OUTPATIENT
Start: 2024-12-16 | End: 2024-12-16 | Stop reason: SDUPTHER

## 2024-12-16 RX ORDER — OXYCODONE HYDROCHLORIDE 5 MG/1
10 TABLET ORAL PRN
Status: DISCONTINUED | OUTPATIENT
Start: 2024-12-16 | End: 2024-12-16 | Stop reason: HOSPADM

## 2024-12-16 RX ORDER — POTASSIUM CHLORIDE 1500 MG/1
40 TABLET, EXTENDED RELEASE ORAL PRN
Status: DISCONTINUED | OUTPATIENT
Start: 2024-12-16 | End: 2024-12-18 | Stop reason: HOSPADM

## 2024-12-16 RX ORDER — DIPHENHYDRAMINE HYDROCHLORIDE 50 MG/ML
12.5 INJECTION INTRAMUSCULAR; INTRAVENOUS
Status: DISCONTINUED | OUTPATIENT
Start: 2024-12-16 | End: 2024-12-16 | Stop reason: HOSPADM

## 2024-12-16 RX ORDER — SODIUM CHLORIDE, SODIUM LACTATE, POTASSIUM CHLORIDE, CALCIUM CHLORIDE 600; 310; 30; 20 MG/100ML; MG/100ML; MG/100ML; MG/100ML
INJECTION, SOLUTION INTRAVENOUS CONTINUOUS
Status: DISCONTINUED | OUTPATIENT
Start: 2024-12-16 | End: 2024-12-16 | Stop reason: HOSPADM

## 2024-12-16 RX ORDER — ONDANSETRON 2 MG/ML
4 INJECTION INTRAMUSCULAR; INTRAVENOUS
Status: DISCONTINUED | OUTPATIENT
Start: 2024-12-16 | End: 2024-12-16 | Stop reason: HOSPADM

## 2024-12-16 RX ORDER — POTASSIUM CHLORIDE 7.45 MG/ML
10 INJECTION INTRAVENOUS PRN
Status: DISCONTINUED | OUTPATIENT
Start: 2024-12-16 | End: 2024-12-18 | Stop reason: HOSPADM

## 2024-12-16 RX ORDER — SODIUM CHLORIDE 0.9 % (FLUSH) 0.9 %
5-40 SYRINGE (ML) INJECTION PRN
Status: DISCONTINUED | OUTPATIENT
Start: 2024-12-16 | End: 2024-12-18 | Stop reason: HOSPADM

## 2024-12-16 RX ORDER — WOUND DRESSING ADHESIVE - LIQUID
LIQUID MISCELLANEOUS PRN
Status: DISCONTINUED | OUTPATIENT
Start: 2024-12-16 | End: 2024-12-16 | Stop reason: HOSPADM

## 2024-12-16 RX ADMIN — PIPERACILLIN AND TAZOBACTAM 4500 MG: 4; .5 INJECTION, POWDER, FOR SOLUTION INTRAVENOUS at 18:13

## 2024-12-16 RX ADMIN — FENTANYL CITRATE 50 MCG: 50 INJECTION, SOLUTION INTRAMUSCULAR; INTRAVENOUS at 15:28

## 2024-12-16 RX ADMIN — LIDOCAINE HYDROCHLORIDE 50 MG: 10 INJECTION, SOLUTION EPIDURAL; INFILTRATION; INTRACAUDAL; PERINEURAL at 14:54

## 2024-12-16 RX ADMIN — GLUCAGON HYDROCHLORIDE 1 MG: 1 INJECTION, POWDER, FOR SOLUTION INTRAMUSCULAR; INTRAVENOUS; SUBCUTANEOUS at 15:36

## 2024-12-16 RX ADMIN — ROCURONIUM BROMIDE 50 MG: 10 INJECTION, SOLUTION INTRAVENOUS at 14:54

## 2024-12-16 RX ADMIN — BUPIVACAINE HYDROCHLORIDE 50 ML: 5 INJECTION, SOLUTION EPIDURAL; INTRACAUDAL; PERINEURAL at 15:01

## 2024-12-16 RX ADMIN — PROPOFOL 200 MG: 10 INJECTION, EMULSION INTRAVENOUS at 14:54

## 2024-12-16 RX ADMIN — SODIUM CHLORIDE 1000 ML: 9 INJECTION, SOLUTION INTRAVENOUS at 13:56

## 2024-12-16 RX ADMIN — MIDAZOLAM HYDROCHLORIDE 2 MG: 1 INJECTION, SOLUTION INTRAMUSCULAR; INTRAVENOUS at 14:48

## 2024-12-16 RX ADMIN — SODIUM CHLORIDE: 9 INJECTION, SOLUTION INTRAVENOUS at 03:22

## 2024-12-16 RX ADMIN — FENTANYL CITRATE 25 MCG: 50 INJECTION, SOLUTION INTRAMUSCULAR; INTRAVENOUS at 16:44

## 2024-12-16 RX ADMIN — ENOXAPARIN SODIUM 30 MG: 100 INJECTION SUBCUTANEOUS at 20:27

## 2024-12-16 RX ADMIN — KETOROLAC TROMETHAMINE 30 MG: 30 INJECTION, SOLUTION INTRAMUSCULAR at 07:59

## 2024-12-16 RX ADMIN — FENTANYL CITRATE 50 MCG: 50 INJECTION, SOLUTION INTRAMUSCULAR; INTRAVENOUS at 16:42

## 2024-12-16 RX ADMIN — FENTANYL CITRATE 25 MCG: 50 INJECTION, SOLUTION INTRAMUSCULAR; INTRAVENOUS at 16:48

## 2024-12-16 RX ADMIN — PIPERACILLIN AND TAZOBACTAM 4500 MG: 4; .5 INJECTION, POWDER, FOR SOLUTION INTRAVENOUS at 08:04

## 2024-12-16 RX ADMIN — DEXAMETHASONE SODIUM PHOSPHATE 10 MG: 10 INJECTION INTRAMUSCULAR; INTRAVENOUS at 15:10

## 2024-12-16 RX ADMIN — SODIUM CHLORIDE: 9 INJECTION, SOLUTION INTRAVENOUS at 18:11

## 2024-12-16 RX ADMIN — SODIUM CHLORIDE: 9 INJECTION, SOLUTION INTRAVENOUS at 16:33

## 2024-12-16 RX ADMIN — SUGAMMADEX 300 MG: 100 INJECTION, SOLUTION INTRAVENOUS at 16:48

## 2024-12-16 RX ADMIN — FENTANYL CITRATE 50 MCG: 50 INJECTION, SOLUTION INTRAMUSCULAR; INTRAVENOUS at 14:54

## 2024-12-16 RX ADMIN — ONDANSETRON 4 MG: 2 INJECTION INTRAMUSCULAR; INTRAVENOUS at 16:38

## 2024-12-16 ASSESSMENT — PAIN SCALES - WONG BAKER: WONGBAKER_NUMERICALRESPONSE: NO HURT

## 2024-12-16 ASSESSMENT — PAIN DESCRIPTION - PAIN TYPE
TYPE: ACUTE PAIN
TYPE: ACUTE PAIN

## 2024-12-16 ASSESSMENT — PAIN DESCRIPTION - ORIENTATION
ORIENTATION: LOWER
ORIENTATION: RIGHT

## 2024-12-16 ASSESSMENT — PAIN DESCRIPTION - DESCRIPTORS
DESCRIPTORS: ACHING;DULL;DISCOMFORT
DESCRIPTORS: SORE
DESCRIPTORS: ACHING;DISCOMFORT

## 2024-12-16 ASSESSMENT — PAIN DESCRIPTION - LOCATION
LOCATION: ABDOMEN

## 2024-12-16 ASSESSMENT — PAIN DESCRIPTION - FREQUENCY: FREQUENCY: CONTINUOUS

## 2024-12-16 ASSESSMENT — PAIN SCALES - GENERAL
PAINLEVEL_OUTOF10: 3
PAINLEVEL_OUTOF10: 2
PAINLEVEL_OUTOF10: 4
PAINLEVEL_OUTOF10: 3
PAINLEVEL_OUTOF10: 2
PAINLEVEL_OUTOF10: 7

## 2024-12-16 ASSESSMENT — PAIN DESCRIPTION - ONSET: ONSET: SUDDEN

## 2024-12-16 ASSESSMENT — PAIN - FUNCTIONAL ASSESSMENT: PAIN_FUNCTIONAL_ASSESSMENT: ACTIVITIES ARE NOT PREVENTED

## 2024-12-16 ASSESSMENT — PAIN DESCRIPTION - DIRECTION: RADIATING_TOWARDS: FLANK

## 2024-12-16 NOTE — ANESTHESIA PROCEDURE NOTES
Peripheral Block    Patient location during procedure: pre-op  Reason for block: post-op pain management and at surgeon's request  Start time: 12/16/2024 2:56 PM  End time: 12/16/2024 3:01 PM  Staffing  Performed: anesthesiologist   Anesthesiologist: Elias Nguyen MD  Performed by: Elias Nguyen MD  Authorized by: Elias Nguyen MD    Preanesthetic Checklist  Completed: patient identified, IV checked, site marked, risks and benefits discussed, surgical/procedural consents, equipment checked, pre-op evaluation, timeout performed, anesthesia consent given, oxygen available and monitors applied/VS acknowledged  Peripheral Block   Patient position: supine  Prep: ChloraPrep  Provider prep: mask and sterile gloves (Sterile probe cover)  Patient monitoring: cardiac monitor, continuous pulse ox, frequent blood pressure checks and IV access  Block type: TAP  Laterality: bilateral  Injection technique: single-shot  Guidance: ultrasound guided  Infiltration strength: 1 %    Needle   Needle type: combined needle/nerve stimulator   Needle gauge: 22 G  Needle localization: anatomical landmarks and ultrasound guidance  Needle length: 10 cm  Assessment   Injection assessment: negative aspiration for heme, no paresthesia on injection and local visualized surrounding nerve on ultrasound  Paresthesia pain: none  Slow fractionated injection: yes  Hemodynamics: stable  Outcomes: uncomplicated    Additional Notes  Ultrasound guidance used to view needle for placement.    Ultrasound image stored,  printed and saved in patient chart.    Sterile probe cover used    25 mls given per side  Medications Administered  BUPivacaine (MARCAINE) PF injection 0.5% - Perineural   50 mL - 12/16/2024 3:01:00 PM

## 2024-12-16 NOTE — PLAN OF CARE
Therapy evaluation completed.  Please see daily notes and/or progress notes for details related to planned treatment interventions, goals and functional performance.     Electronically signed by Brielle Cheng PT on 12/16/2024 at 11:52 AM

## 2024-12-16 NOTE — PROGRESS NOTES
DVT / VTE PROPHYLAXIS EVALUATION    Recent Labs     12/15/24  2127   BUN 8   CREATININE 0.49*      HGB 15.5   HCT 47.8*     ADMITTING DX OR CHIEF COMPLAINT? cholelithiasis  WARFARIN? DOAC'S? no  ANY APPARENT BLEEDING? no  SCHEDULED SURGERY? No, GI consulted     If yes to following, excluded from auto adjustment in Table 1 of policy - please contact provider with recommendations as appropriate.  Include condition/exception in scratch notes. Yes No   Trauma Service or Ortho Surgery []  [x]    Pregnancy []  [x]        Current order:  Enoxaparin 40 mg SUBQ once daily      Height: 167.6 cm (5' 6\"), Weight - Scale: 123.7 kg (272 lb 9.6 oz)  Estimated Creatinine Clearance: 238 mL/min (A) (based on SCr of 0.49 mg/dL (L)).    Plan:  Pharmacologic VTE prophylaxis modified based on patient weight per [facility]/P&T approved protocol     Patient Weight (kg)      50.9 and below .9 101-150.9 151-174.9 175 or greater   Estimated   CrCl  (ml/min) 30 or greater []   30 mg   SUBQ daily   []   40 mg   SUBQ daily (or 30 mg BID for orthopedic cases) [x]  30 mg SUBQ   BID*  []  40 mg   SUBQ   BID []  60mg SUBQ BID    15-29.9 []  UFH 5000   units SUBQ BID []  30 mg   SUBQ daily [] 30 mg SUBQ   daily []  40 mg SUBQ   daily [] 60 mg SUBQ   Daily*    Less than 15 or dialysis []  UFH 5000   units SUBQ BID [] UFH 5000 units SUBQ TID []  UFH 7500   units   SUBQ TID*   *Do not exceed enoxaparin 40mg daily or UFH 5000 units SUBQ TID in patients with epidurals,   lumbar drains, or external ventricular drains    ARVIN BoykinPh.  12/16/2024  3:14 AM

## 2024-12-16 NOTE — PROGRESS NOTES
Physical Therapy Med Surg Initial Assessment  Facility/Department: [address] MED SURG UNIT  Room: [place] [address]       NAME: Jina Hobson  : 2000 (24 y.o.)  MRN: [id number]  CODE STATUS: Full Code    Date of Service: 2024    Patient Diagnosis(es): Choledocholithiasis [K80.50]   No chief complaint on file.    Patient Active Problem List    Diagnosis Date Noted    Gastroesophageal reflux disease 2022    Situational anxiety 2022    Choledocholithiasis 2024    Class 3 severe obesity due to excess calories without serious comorbidity with body mass index (BMI) of 40.0 to 44.9 in adult 2019    DUB (dysfunctional uterine bleeding) 2019    Bee sting allergy 2010        Past Medical History:   Diagnosis Date    COVID-19     2021     Past Surgical History:   Procedure Laterality Date    TONSILLECTOMY AND ADENOIDECTOMY      WISDOM TOOTH EXTRACTION         Chart Reviewed: Yes  Patient assessed for rehabilitation services?: Yes    Restrictions:  Restrictions/Precautions: Fall Risk     SUBJECTIVE: Subjective: \"I went to the [place] game yesterday.\"    Pain   0/10    Post Treatment Pain Screenin/10    Prior Level of Function:  Social/Functional History  Lives With: Alone  Type of Home: Apartment  Home Layout: One level  Home Access: Stairs to enter with rails  Entrance Stairs - Number of Steps: 4  Entrance Stairs - Rails: Left  Bathroom Shower/Tub: Tub/Shower unit  Bathroom Toilet: Standard  Bathroom Equipment: None  Bathroom Accessibility: Accessible  Home Equipment: None  Has the patient had two or more falls in the past year or any fall with injury in the past year?: No  Prior Level of Assist for ADLs: Independent  Prior Level of Assist for Homemaking: Independent  Prior Level of Assist for Transfers: Independent  Active : Yes  Mode of Transportation: Car  Occupation: Full time employment  [place] [employer] [place]    OBJECTIVE:   Vision  Exceptions: Wears glasses at all times  Hearing: Within functional limits    Cognition:  Overall Orientation Status: Within Normal Limits  Orientation Level: Oriented X4  Follows Commands: Within Functional Limits    Observation/Palpation  Observation: supine in bed on phone upon arrival, pleasant & cooperative, IV, slight yellow tint to skin    ROM:  RLE AROM: WFL    Strength:  Strength RLE  Strength RLE: WNL  Strength LLE  Strength LLE: WNL    Bed mobility  Rolling to Left: Independent  Rolling to Right: Independent  Supine to Sit: Independent  Sit to Supine: Independent    Transfers  Sit to Stand: Independent  Stand to Sit: Independent  Bed to Chair: Independent    Ambulation  Surface: Level tile  Device: No Device  Assistance: Independent  Gait Deviations: None  Distance: 150'  More Ambulation?: No    Stairs/Curb  Stairs?: No    Activity Tolerance  Activity Tolerance: Patient tolerated evaluation without incident      ASSESSMENT:   Body Structures, Functions, Activity Limitations Requiring Skilled Therapeutic Intervention: Increased pain  Decision Making: Low Complexity  History: high  Exam: medium  Clinical Presentation: low    Treatment Diagnosis: increased pain  Therapy Prognosis: Excellent    DISCHARGE RECOMMENDATIONS:  Discharge Recommendations: No therapy recommended at discharge    Assessment: Patient is a 24 year old female who is currently hospitalized secondary to choledocholilthiasis who is functioning at reported functional mobility baseline of independent.  Patient does not demonstrate any acute PT needs.  Patient discharged following initial evaluation.  Requires PT Follow-Up: No      PLAN OF CARE:  Physical Therapy Plan  General Plan: Discharge with evaluation only  Safety Devices  Type of Devices: All fall risk precautions in place, Call light within reach, Left in bed    AMPAC (6 CLICK) BASIC MOBILITY  AM-PAC Inpatient Mobility Raw Score : 24    Therapy Time:   Individual   Time In 1038   Time

## 2024-12-16 NOTE — PROGRESS NOTES
Hospitalist Progress Note      PCP: Tomas Byers MD    Date of Admission: 12/16/2024    Chief Complaint:  no acute events, is afebrile, stable HD, pain is controlled with Toradol    Medications:  Reviewed    Infusion Medications    sodium chloride      sodium chloride 100 mL/hr at 12/16/24 0322     Scheduled Medications    sodium chloride flush  5-40 mL IntraVENous 2 times per day    enoxaparin  30 mg SubCUTAneous BID    piperacillin-tazobactam  4,500 mg IntraVENous Q8H     PRN Meds: sodium chloride flush, sodium chloride, potassium chloride **OR** potassium alternative oral replacement **OR** potassium chloride, magnesium sulfate, ondansetron **OR** ondansetron, melatonin, polyethylene glycol, acetaminophen **OR** acetaminophen, ketorolac    No intake or output data in the 24 hours ending 12/16/24 1008    Exam:    /83   Pulse 82   Temp 98.1 °F (36.7 °C) (Oral)   Resp 16   Ht 1.676 m (5' 6\")   Wt 123.7 kg (272 lb 9.6 oz)   LMP 12/12/2024   SpO2 100%   BMI 44.00 kg/m²     General appearance: appears stated age and cooperative.  Respiratory:  clear to auscultation, bilaterally   Cardiovascular: Regular rate and rhythm, S1/S2.  Abdomen: Soft, active bowel sounds.  Musculoskeletal: No edema bilaterally.        Labs:   Recent Labs     12/15/24  2127 12/16/24  0546   WBC 8.4 7.2   HGB 15.5 13.2   HCT 47.8* 40.2    325     Recent Labs     12/15/24  2127 12/16/24  0546    139   K 3.9 4.0    105   CO2 24 23   BUN 8 7   CREATININE 0.49* 0.45*   CALCIUM 10.2* 9.1     Recent Labs     12/15/24  2127 12/15/24  2354 12/16/24  0546   *  --  280*   *  --  434*   BILIDIR  --  3.8*  --    BILITOT 4.8* 4.9* 4.2*   ALKPHOS 295*  --  262*     No results for input(s): \"INR\" in the last 72 hours.  Recent Labs     12/15/24  2148   CKTOTAL 142       Urinalysis:      Lab Results   Component Value Date/Time    NITRU Color Interfer 12/15/2024 09:07 PM    WBCUA 3-5 12/15/2024 09:07 PM    BACTERIA

## 2024-12-16 NOTE — BRIEF OP NOTE
Brief Postoperative Note      Patient: Jina Hobson  YOB: 2000  MRN: [id number]    Date of Procedure: 12/16/2024    Pre-Op Diagnosis Codes:      * Acute cholecystitis [K81.0], suspected choledocholithiasis    Post-Op Diagnosis: Same and no evidence of choledocholithiasis       Procedure: Laparoscopic cholecystectomy, laparoscopic transcystic common bile duct exploration    Surgeon(s):  Kiko Griffiths MD    Assistant:  First Assistant: Nieves Wellington    Anesthesia: General, bilateral tap block    Estimated Blood Loss (mL): 40    Complications: None    Specimens:   ID Type Source Tests Collected by Time Destination   A : GALLBLADDER Tissue Gallbladder SURGICAL PATHOLOGY Kiko Griffiths MD 12/16/2024 1546        Implants:  * No implants in log *      Drains: * No LDAs found *    Findings:  Infection Present At Time Of Surgery (PATOS) (choose all levels that have infection present):  No infection present  Other Findings: Acute cholecystitis, common bile duct exploration without obstruction.  I was able to advance into the duodenum without problems.    Electronically signed by KIKO GRIFFITHS MD on 12/16/2024 at 4:48 PM

## 2024-12-16 NOTE — ED NOTES
Dr. Ortiz spoke with general surgery Dr. Wang who recommends he consult GI. TC to page out for GI.

## 2024-12-16 NOTE — CONSULTS
Inpatient consult to GI  Consult performed by: Ifrah Tobar, APRN - CNP  Consult ordered by: Tramaine Mcallister MD          Patient Name: Jina Hobson  Admit Date: 2024  2:31 AM  MR #: [id number]  : 2000    Attending Physician: Kiran Doss MD  Reason for consult: Symptomatic cholelithiasis, possible choledocholithiasis with elevated LFTs    History of Presenting Illness:      Jina Hobson is a 24 y.o. female on hospital day 0 with a history of chronic back pain, COVID-19 infection in .  Past surgical history of tonsillectomy and wisdom tooth extraction.  Family history is negative for GI malignancies.  Social history no nicotine, EtOH or illicit drug use. History Obtained From:  patient, electronic medical record    GI consult for cholelithiasis and possible choledocholithiasis with elevated LFTs-patient was admitted with abdominal pain that radiates to her back, symptoms started on Friday, she was seen at [facility] and sent home with muscle relaxants, she does have a history of chronic back pain, however her symptoms worsened and she noticed that her eyes were yellow and her urine was dark. On arrival was found to have abnormal liver enzymes with total bilirubin of 4.2, transaminases greater than 10 times normal, alk phos 262, no leukocytosis, no anemia, no thrombocytopenia, no fever or chills, no history of chronic liver disease, no known history of viral hepatitis or EtOH.  Does have CT imaging of cholelithiasis, no definitive CBD dilation or choledocho, has ultrasound of the abdomen pending.    History:      Past Medical History:   Diagnosis Date    COVID-19     2021     Past Surgical History:   Procedure Laterality Date    TONSILLECTOMY AND ADENOIDECTOMY      WISDOM TOOTH EXTRACTION       Family History  No family history on file.  [] Unable to obtain due to ventilated and/ or neurologic status  Social History     Socioeconomic History    Marital status: Single     Spouse name:

## 2024-12-16 NOTE — PROGRESS NOTES
Pharmacy Note - Extended Infusion Beta-Lactam Adjustment    Piperacillin/Tazobactam 3375mg Q6h for treatment of Intra-abdominal Infection. Per [facility] Extended Infusion Beta-Lactam Policy, piperacillin/tazobactam will be changed to 4500mg loading dose followed by 4500mg Q8h extended infusion due to patient's BMI.    Estimated Creatinine Clearance: Estimated Creatinine Clearance: 259 mL/min (A) (based on SCr of 0.45 mg/dL (L)).    BMI: Body mass index is 44 kg/m².    Please call with any questions.    Thank you,    Sara Thompson, [facility]

## 2024-12-16 NOTE — PLAN OF CARE
Problem: Discharge Planning  Goal: Discharge to home or other facility with appropriate resources  Outcome: Progressing  Flowsheets (Taken 12/16/2024 0353)  Discharge to home or other facility with appropriate resources: Identify barriers to discharge with patient and caregiver     Problem: Pain  Goal: Verbalizes/displays adequate comfort level or baseline comfort level  Outcome: Progressing  Flowsheets (Taken 12/16/2024 0353)  Verbalizes/displays adequate comfort level or baseline comfort level:   Encourage patient to monitor pain and request assistance   Assess pain using appropriate pain scale   Administer analgesics based on type and severity of pain and evaluate response   Implement non-pharmacological measures as appropriate and evaluate response

## 2024-12-16 NOTE — FLOWSHEET NOTE
0744 - Dr. Ramirez on as covering doctor for Dr. Doss at this time so he was notified of patient's need for transfer according to Dr. Griffiths. Patient will need ERCP and Dr. Lovell is not available at this time.     1706 - Dr. Doss notified that patient will need transfer to facility that can complete ERCP.

## 2024-12-16 NOTE — PROGRESS NOTES
Patient's chart reviewed.    Imaging reviewed.  Full consult to follow    Patient has a markedly elevated bilirubin which requires ERCP.    I believe Dr. Lovell is out for the next few weeks.    Recommend transfer for ERCP.    Addendum: I went and saw Jina.  She has been having abdominal pain and back pain for the past 72 hours.  Outside emergency department was not helpful.  She was admitted to this hospital with continued abdominal pain and an elevated bilirubin level.    I reviewed her CAT scan and ultrasound.  I reviewed her blood work.    At this time it might be several days until patient is transferred for ERCP.  Given her acute cholecystitis, I offered a laparoscopic cholecystectomy with intraoperative evaluation of her common bile duct.  I do have the ability to do laparoscopic transcystic common bile duct explorations.  I am not sure if the patient's common bile duct is of the caliber to have this performed.    Regardless I discussed the potential for cholecystectomy as well as cholangiogram and common bile duct exploration if possible.  This would help with her gallbladder issue.  She might still require a postoperative ERCP in the next few days if I cannot clear her common bile duct.    She understands the risks and benefits of this approach.  I offered again ERCP but given the limitations of transfer, she wishes to proceed with cholecystectomy and evaluation with possible stone extraction if present and possible.    Consent was obtained    Arrangements for the operating room made today.

## 2024-12-16 NOTE — ED TRIAGE NOTES
Complains of low back pain for the last 2 days.  Seen at [facility] 2 days ago.  Starting last night her urine turned very dark.  No appetite for 2 days.  Drinking 160+ oz per day of water.

## 2024-12-16 NOTE — H&P
Hospitalist Group   History and Physical      CHIEF COMPLAINT: Abdominal pain, back pain, dark urine    History of Present Illness:  24 y.o. female with no significant past medical history presents with abdominal pain, back pain, dark urine.  Patient started having pain on Friday.  She went to [facility] and she was told that was musculoskeletal pain.  She was sent home on muscle relaxant.  Her pain did not improve and continued to worsen.  She described the pain as right upper quadrant and left lower quadrant.  Not associated with food.  No associated nausea or vomiting.  Pain radiates to the back.  No shortness of breath, cough, urinary symptoms.  Patient started noticing darker urine and felt like her skin was getting darker.  Therefore, she was concerned and decided to come back to the ER.  Denied any medical history of similar pain in the past.  She has family history of gallbladder disease including gallstones.    REVIEW OF SYSTEMS:  no fevers, chills, cp, sob, n/v, ha, vision/hearing changes, wt changes, hot/cold flashes, other open skin lesions, diarrhea, constipation, dysuria/hematuria unless noted in HPI. Complete ROS performed with the patient and is otherwise negative.      PMH:  Past Medical History:   Diagnosis Date    COVID-19     12/23/2021       Surgical History:  Past Surgical History:   Procedure Laterality Date    TONSILLECTOMY AND ADENOIDECTOMY      WISDOM TOOTH EXTRACTION         Medications Prior to Admission:    Prior to Admission medications    Medication Sig Start Date End Date Taking? Authorizing Provider   cyclobenzaprine (FLEXERIL) 5 MG tablet Take 1 tablet by mouth 3 times daily as needed for Muscle spasms  Patient not taking: Reported on 12/16/2024    Tiny Corral MD   naproxen (NAPROSYN) 250 MG tablet Take 1 tablet by mouth 2 times daily (with meals)  Patient not taking: Reported on 12/16/2024    Tiny Corral MD   ondansetron (ZOFRAN) 4 MG tablet Take 1  tablet by mouth every 8 hours as needed for Nausea  Patient not taking: Reported on 12/16/2024 12/15/24   Ang Ortiz MD   ketorolac (TORADOL) 10 MG tablet Take 1 tablet by mouth every 6 hours as needed for Pain  Patient not taking: Reported on 12/16/2024 12/15/24   Ang Ortiz MD   levonorgestrel-ethinyl estradiol (SEASONALE) 0.15-0.03 MG per tablet Take 1 tablet by mouth daily  Patient not taking: Reported on 12/16/2024 1/22/24   Tomas Byers MD   EPINEPHrine (EPIPEN 2-ALEXIS) 0.3 MG/0.3ML SOAJ injection Inject 0.3 mLs into the muscle once for 1 dose Use as directed for allergic reaction 6/1/23 1/22/24  Tomas Byers MD   esomeprazole (NEXIUM) 40 MG delayed release capsule Take 1 capsule by mouth daily  Patient not taking: Reported on 12/16/2024 6/16/22   Tomas Byers MD   propranolol (INDERAL) 20 MG tablet Take 1 tablet by mouth 3 times daily as needed (anxiety)  Patient not taking: Reported on 12/16/2024 6/16/22   Tomas Byers MD       Allergies:    Bee venom    Social History:    reports that she has never smoked. She has been exposed to tobacco smoke. She has never used smokeless tobacco. She reports that she does not drink alcohol and does not use drugs.    Family History:   No family history on file.    PHYSICAL EXAM:  Vitals:  Ht 1.676 m (5' 6\")   Wt 92.1 kg (203 lb)   LMP 12/12/2024   BMI 32.77 kg/m²   General Appearance: alert and oriented to person, place and time, well developed and well- nourished, in no acute distress  Pulmonary/Chest: clear to auscultation bilaterally- no wheezes   Cardiovascular: normal rate, regular rhythm, normal S1 and S2, no murmurs, rubs, clicks, or gallops, distal pulses intact, no carotid bruits  Abdomen: soft, right upper quadrant tenderness, left lower quadrant tenderness, non-distended, normal bowel sounds, no masses or organomegaly  Neurologic: reflexes normal and symmetric, no cranial nerve deficit, gait, coordination and speech normal

## 2024-12-16 NOTE — ANESTHESIA PRE PROCEDURE
Department of Anesthesiology  Preprocedure Note       Name:  Jina Hobson   Age:  24 y.o.  :  2000                                          MRN:  [id number]         Date:  2024      Surgeon: Surgeon(s):  Kiko Griffiths MD    Procedure: Procedure(s):  Laparoscopic cholecystectomy with cholangiogram, possible common bile duct exploration  ROOM 480    Medications prior to admission:   Prior to Admission medications    Medication Sig Start Date End Date Taking? Authorizing Provider   cyclobenzaprine (FLEXERIL) 5 MG tablet Take 1 tablet by mouth 3 times daily as needed for Muscle spasms  Patient not taking: Reported on 2024    Tiny Corral MD   naproxen (NAPROSYN) 250 MG tablet Take 1 tablet by mouth 2 times daily (with meals)  Patient not taking: Reported on 2024    Tiny Corral MD   ondansetron (ZOFRAN) 4 MG tablet Take 1 tablet by mouth every 8 hours as needed for Nausea  Patient not taking: Reported on 2024 12/15/24   Ang Ortiz MD   ketorolac (TORADOL) 10 MG tablet Take 1 tablet by mouth every 6 hours as needed for Pain  Patient not taking: Reported on 2024 12/15/24   Ang Ortiz MD   levonorgestrel-ethinyl estradiol (SEASONALE) 0.15-0.03 MG per tablet Take 1 tablet by mouth daily  Patient not taking: Reported on 2024   Tomas Byers MD   EPINEPHrine (EPIPEN 2-ALEXIS) 0.3 MG/0.3ML SOAJ injection Inject 0.3 mLs into the muscle once for 1 dose Use as directed for allergic reaction 23  Tomas Byers MD   esomeprazole (NEXIUM) 40 MG delayed release capsule Take 1 capsule by mouth daily  Patient not taking: Reported on 2024   Tomas Byers MD   propranolol (INDERAL) 20 MG tablet Take 1 tablet by mouth 3 times daily as needed (anxiety)  Patient not taking: Reported on 2024   Tomas Byers MD       Current medications:    Current Facility-Administered Medications   Medication Dose Route

## 2024-12-16 NOTE — ED PROVIDER NOTES
[facility] ED  eMERGENCY dEPARTMENT eNCOUnter      Pt Name: Jina Hobson  MRN: [id number]  Birthdate 2000  Date of evaluation: 12/15/2024  Provider: Ang Ortiz MD    CHIEF COMPLAINT       Chief Complaint   Patient presents with    Back Pain         HISTORY OF PRESENT ILLNESS   (Location/Symptom, Timing/Onset,Context/Setting, Quality, Duration, Modifying Factors, Severity)  Note limiting factors.   Jina Hobson is a 24 y.o. female who presents to the emergency department with complaint of back and bilateral flank pain of 4 days duration.  Patient was seen at [facility] 2 days ago and discharged on muscle relaxers and anti-inflammatory.  Back pain has continued.  She began with tea-colored urine today.  Denies prior history of kidney stones.  No nausea or vomiting.  No diarrhea or constipation.  Pain is worse with movement and cough.  It is sharp.  It started gradually.  It radiates from the back to the flank.  She is morbidly obese with BMI of 51.17.  Denies any injuries.    HPI    Nursing Notes were reviewed.    REVIEW OF SYSTEMS    (2-9 systems for level 4, 10 or more for level 5)     Review of Systems   Constitutional: Negative.  Negative for activity change, appetite change, chills, fatigue and fever.   HENT:  Negative for congestion, ear discharge, ear pain, hearing loss, rhinorrhea, sinus pressure and sore throat.    Eyes:  Negative for photophobia, pain and visual disturbance.   Respiratory:  Negative for apnea, cough, shortness of breath and wheezing.    Cardiovascular:  Negative for chest pain, palpitations and leg swelling.   Gastrointestinal:  Negative for abdominal distention, abdominal pain, constipation, diarrhea, nausea and vomiting.   Endocrine: Negative for cold intolerance, heat intolerance and polyuria.   Genitourinary:  Positive for flank pain and hematuria. Negative for dysuria, frequency and urgency.   Musculoskeletal:  Positive for back pain. Negative for

## 2024-12-16 NOTE — ANESTHESIA POSTPROCEDURE EVALUATION
Department of Anesthesiology  Postprocedure Note    Patient: Jina Hobson  MRN: [id number]  YOB: 2000  Date of evaluation: 12/16/2024    Procedure Summary       Date: 12/16/24 Room / Location: [address]    Anesthesia Start: 1448 Anesthesia Stop: 1659    Procedure: Laparoscopic cholecystectomy with cholangiogram and common bile duct exploration (Abdomen) Diagnosis:       Acute cholecystitis      (Acute cholecystitis [K81.0])    Surgeons: Kiko Griffiths MD Responsible Provider: Elias Nguyen MD    Anesthesia Type: general ASA Status: 2            Anesthesia Type: No value filed.    Altagracia Phase I: Altagracia Score: 10    Altagracia Phase II:      Anesthesia Post Evaluation    Patient location during evaluation: bedside  Patient participation: complete - patient participated  Level of consciousness: awake and awake and alert  Airway patency: patent  Nausea & Vomiting: no nausea and no vomiting  Cardiovascular status: blood pressure returned to baseline and hemodynamically stable  Respiratory status: acceptable  Hydration status: euvolemic  Pain management: adequate        No notable events documented.

## 2024-12-17 PROBLEM — R10.13 EPIGASTRIC PAIN: Status: ACTIVE | Noted: 2024-12-17

## 2024-12-17 PROBLEM — R74.8 ABNORMAL LIVER ENZYMES: Status: ACTIVE | Noted: 2024-12-17

## 2024-12-17 LAB
ALBUMIN SERPL-MCNC: 3.5 G/DL (ref 3.5–4.6)
ALP SERPL-CCNC: 251 U/L (ref 40–130)
ALT SERPL-CCNC: 315 U/L (ref 0–33)
ANION GAP SERPL CALCULATED.3IONS-SCNC: 14 MEQ/L (ref 9–15)
AST SERPL-CCNC: 121 U/L (ref 0–35)
BASOPHILS # BLD: 0 K/UL (ref 0–0.2)
BASOPHILS NFR BLD: 0.1 %
BILIRUB SERPL-MCNC: 1.3 MG/DL (ref 0.2–0.7)
BUN SERPL-MCNC: 6 MG/DL (ref 6–20)
CALCIUM SERPL-MCNC: 8.7 MG/DL (ref 8.5–9.9)
CHLORIDE SERPL-SCNC: 105 MEQ/L (ref 95–107)
CO2 SERPL-SCNC: 19 MEQ/L (ref 20–31)
CREAT SERPL-MCNC: 0.48 MG/DL (ref 0.5–0.9)
EOSINOPHIL # BLD: 0 K/UL (ref 0–0.7)
EOSINOPHIL NFR BLD: 0 %
ERYTHROCYTE [DISTWIDTH] IN BLOOD BY AUTOMATED COUNT: 12.9 % (ref 11.5–14.5)
GLOBULIN SER CALC-MCNC: 3 G/DL (ref 2.3–3.5)
GLUCOSE SERPL-MCNC: 92 MG/DL (ref 70–99)
HCT VFR BLD AUTO: 38.2 % (ref 37–47)
HGB BLD-MCNC: 12.9 G/DL (ref 12–16)
LYMPHOCYTES # BLD: 1.2 K/UL (ref 1–4.8)
LYMPHOCYTES NFR BLD: 10.8 %
MCH RBC QN AUTO: 30.4 PG (ref 27–31.3)
MCHC RBC AUTO-ENTMCNC: 33.8 % (ref 33–37)
MCV RBC AUTO: 90.1 FL (ref 79.4–94.8)
MONOCYTES # BLD: 0.5 K/UL (ref 0.2–0.8)
MONOCYTES NFR BLD: 4.1 %
NEUTROPHILS # BLD: 9.7 K/UL (ref 1.4–6.5)
NEUTS SEG NFR BLD: 84.7 %
PLATELET # BLD AUTO: 338 K/UL (ref 130–400)
POTASSIUM SERPL-SCNC: 4.4 MEQ/L (ref 3.4–4.9)
PROT SERPL-MCNC: 6.5 G/DL (ref 6.3–8)
RBC # BLD AUTO: 4.24 M/UL (ref 4.2–5.4)
SODIUM SERPL-SCNC: 138 MEQ/L (ref 135–144)
WBC # BLD AUTO: 11.5 K/UL (ref 4.8–10.8)

## 2024-12-17 PROCEDURE — 85025 COMPLETE CBC W/AUTO DIFF WBC: CPT

## 2024-12-17 PROCEDURE — 97165 OT EVAL LOW COMPLEX 30 MIN: CPT

## 2024-12-17 PROCEDURE — 99024 POSTOP FOLLOW-UP VISIT: CPT | Performed by: COLON & RECTAL SURGERY

## 2024-12-17 PROCEDURE — 6360000002 HC RX W HCPCS: Performed by: COLON & RECTAL SURGERY

## 2024-12-17 PROCEDURE — 80053 COMPREHEN METABOLIC PANEL: CPT

## 2024-12-17 PROCEDURE — 2500000003 HC RX 250 WO HCPCS: Performed by: COLON & RECTAL SURGERY

## 2024-12-17 PROCEDURE — 99024 POSTOP FOLLOW-UP VISIT: CPT | Performed by: NURSE PRACTITIONER

## 2024-12-17 PROCEDURE — 1210000000 HC MED SURG R&B

## 2024-12-17 PROCEDURE — 36415 COLL VENOUS BLD VENIPUNCTURE: CPT

## 2024-12-17 PROCEDURE — 2580000003 HC RX 258: Performed by: COLON & RECTAL SURGERY

## 2024-12-17 RX ADMIN — KETOROLAC TROMETHAMINE 30 MG: 30 INJECTION, SOLUTION INTRAMUSCULAR at 15:42

## 2024-12-17 RX ADMIN — SODIUM CHLORIDE, PRESERVATIVE FREE 10 ML: 5 INJECTION INTRAVENOUS at 20:46

## 2024-12-17 RX ADMIN — PIPERACILLIN AND TAZOBACTAM 4500 MG: 4; .5 INJECTION, POWDER, FOR SOLUTION INTRAVENOUS at 17:47

## 2024-12-17 RX ADMIN — PIPERACILLIN AND TAZOBACTAM 4500 MG: 4; .5 INJECTION, POWDER, FOR SOLUTION INTRAVENOUS at 09:18

## 2024-12-17 RX ADMIN — ENOXAPARIN SODIUM 30 MG: 100 INJECTION SUBCUTANEOUS at 20:46

## 2024-12-17 RX ADMIN — PIPERACILLIN AND TAZOBACTAM 4500 MG: 4; .5 INJECTION, POWDER, FOR SOLUTION INTRAVENOUS at 02:31

## 2024-12-17 ASSESSMENT — PAIN SCALES - GENERAL
PAINLEVEL_OUTOF10: 3
PAINLEVEL_OUTOF10: 0
PAINLEVEL_OUTOF10: 3
PAINLEVEL_OUTOF10: 6

## 2024-12-17 ASSESSMENT — PAIN SCALES - WONG BAKER
WONGBAKER_NUMERICALRESPONSE: NO HURT

## 2024-12-17 ASSESSMENT — PAIN DESCRIPTION - LOCATION: LOCATION: ABDOMEN

## 2024-12-17 NOTE — PROGRESS NOTES
Gastroenterology Progress Note    Jina Hobson is a 24 y.o. female patient.  Hospitalization Day:1    Chief C/O: Cholecystitis    SUBJECTIVE: Seen and examined, postop day 1 from laparoscopic cholecystectomy with common bile duct exploration, patient's abdominal pain is well-controlled, she has been tolerating clear liquid diet, LFTs have significantly trended down to almost near normal.    ROS:  Gastrointestinal ROS: Postoperative abdominal pain, no change in bowel habits, or black or bloody stools    Physical    VITALS:  /68   Pulse 86   Temp 97.9 °F (36.6 °C) (Oral)   Resp 18   Ht 1.676 m (5' 6\")   Wt 123.7 kg (272 lb 9.6 oz)   LMP 2024   SpO2 98%   BMI 44.00 kg/m²   TEMPERATURE:  Current - Temp: 97.9 °F (36.6 °C); Max - Temp  Av.9 °F (36.6 °C)  Min: 97.8 °F (36.6 °C)  Max: 97.9 °F (36.6 °C)    General:  Alert and oriented,  No apparent distress  Skin- without jaundice  Eyes: anicteric sclera  Cardiac: RRR, Nl s1s2, without murmurs  Lungs CTA Bilaterally, normal effort  Abdomen soft, ND, NT, no HSM, Bowel sounds normal  Ext: without edema  Neuro: no asterixis     Data    Data Review:    Recent Labs     12/15/24  2127 12/16/24  0546 24  0517   WBC 8.4 7.2 11.5*   HGB 15.5 13.2 12.9   HCT 47.8* 40.2 38.2   MCV 90.0 89.1 90.1    325 338     Recent Labs     12/15/24  2127 12/16/24  0546 24  0517    139 138   K 3.9 4.0 4.4    105 105   CO2 24 23 19*   BUN 8 7 6   CREATININE 0.49* 0.45* 0.48*     Recent Labs     12/15/24  2127 12/15/24  2354 24  0546 24  0517   *  --  280* 121*   *  --  434* 315*   BILIDIR  --  3.8*  --   --    BILITOT 4.8* 4.9* 4.2* 1.3*   ALKPHOS 295*  --  262* 251*     Recent Labs     12/15/24  2127   LIPASE 17     Recent Labs     24  1200   PROTIME 14.1   INR 1.1           ASSESSMENT:  23 y/o female admitted with abdominal pain that radiates to her back, symptoms started on Friday, she was seen at [facility] and

## 2024-12-17 NOTE — OP NOTE
[address] [address]                            OPERATIVE REPORT      PATIENT NAME: AKBAR ELKINS                : 2000  MED REC NO: [id number]                        ROOM: [facility]  ACCOUNT NO: [id number]                       ADMIT DATE: 2024  PROVIDER: Kiko Griffiths MD      DATE OF PROCEDURE:  2024    SURGEON:  Kiko Griffiths MD    ASSISTANT:  Ms. Wellington.    PREOPERATIVE DIAGNOSES:    1. Acute cholecystitis.  2. Suspected choledocholithiasis.    POSTOPERATIVE DIAGNOSIS:  Acute cholecystitis.    PROCEDURES:    1. Laparoscopic cholecystectomy.  2. Transcystic laparoscopic common bile duct exploration.    ANESTHESIA:    1. General endotracheal anesthesia.  2. Bilateral TAP block.    ESTIMATED BLOOD LOSS:  40.    SPECIMEN:  Gallbladder.    COMPLICATIONS:  None.    INDICATIONS:  This is a 24-year-old female with acute cholecystitis as well as an elevated bilirubin and transaminases consistent with suspected choledocholithiasis.  This was not seen on ultrasound or CT scan.  We discussed the risks and benefits of preoperative ERCP versus laparoscopic cholecystectomy with evaluation of her common bile duct.  Risks and benefits of both approaches were described as outlined in the progress note.  She wished to proceed with cholecystectomy.  Consent obtained.    DESCRIPTION OF PROCEDURE:  She was taken to the operating room, placed in a supine position.  General endotracheal anesthesia was administered.  Bilateral TAP block was placed preoperatively.  Her abdomen was prepped and draped with a ChloraPrep-containing solution.  She was on Zosyn preoperatively.  A time-out was taken for appropriate verification.    A 5 mm supraumbilical incision was made.  An optical trocar was placed and pneumoperitoneum was established.  A subxiphoid and 2 lateral ports were placed under direct visualization.  The gallbladder was acutely  inflamed.  It was held at the fundus as well as the neck.  The cystic duct was dilated.  It was circumferentiated.  A bridging vein was clipped and the cystic artery was clipped and cut with 2 proximal and 1 distal clip.  Distal clip was placed on the cystic duct, a hole was made with scissors.  Given its dilation as well as her elevated liver function tests, a flexible disposable ureteroscope was placed through a separate port and down the cystic duct into the common bile duct without difficulty.    I was able to advance the choledochoscope through the common bile duct to the ampulla where it was open without obstruction.  I was able to push into the duodenum without difficulty.  An ampule of glucagon was given approximately 10 minutes before choledochoscopy.    I did 2-3 passes with this choledochoscope and there were no evidence of stones or any other abnormality.  The scope was retroflexed and the proximal common bile duct was evaluated as well.    The choledochoscope was removed from the cystic duct.  An endo-loop was placed using a 2-0 Vicryl and 2 clips were placed on the cystic duct.  It was securely affixed without issues.    The liver bed was evaluated.  There was no bleeding or bile leak seen.  I waited an additional 10 minutes to ensure that there was no leak.    A 19-Burkinan round Pato drain was placed in the right upper quadrant, brought out laterally, and sutured to the skin.    The remainder of her laparoscopic exam was unremarkable.  The omentum was draped back up in the right upper quadrant.  No other abnormalities were seen.  Lap, needle, instrument, and Ray-Caro counts were all correct.    The ports were removed and pneumoperitoneum was released.    The fascia of the subxiphoid port site was closed with interrupted 0 Vicryl suture.  The skin incisions were closed with staples and Band-Aids were applied.  Following completion, the lap, needle, instrument, and Ray-Caro counts were all correct.    The

## 2024-12-17 NOTE — PROGRESS NOTES
Postop day #1 laparoscopic cholecystectomy with common bile duct exploration    Liver function tests have almost resolved to normal.    Patient doing well.    Diet advanced.  IV fluids Hep-Lock    MARCIA drain serosanguineous    Patient can be discharged probably tomorrow after 1 more review of liver function test.    Doing well.  Please call if any questions.

## 2024-12-17 NOTE — CARE COORDINATION
Case Management Assessment  Initial Evaluation    Date/Time of Evaluation: 12/17/2024 6:45 AM  Assessment Completed by: Kathe Melton RN    If patient is discharged prior to next notation, then this note serves as note for discharge by case management.    Patient Name: Jina Hobson                   YOB: 2000  Diagnosis: Choledocholithiasis [K80.50]                   Date / Time: 12/16/2024  2:31 AM    Patient Admission Status: Inpatient   Readmission Risk (Low < 19, Mod (19-27), High > 27): Readmission Risk Score: 5.6    Current PCP: Tomas Byers MD  PCP verified by CM?      Chart Reviewed: Yes      History Provided by:    Patient Orientation:      Patient Cognition:      Hospitalization in the last 30 days (Readmission):  No    If yes, Readmission Assessment in  Navigator will be completed.    Advance Directives:      Code Status: Full Code   Patient's Primary Decision Maker is: Legal Next of Kin      Discharge Planning:    Patient lives with: Alone Type of Home: Apartment  Primary Care Giver:    Patient Support Systems include: Family Members, Friends/Neighbors   Current Financial resources:    Current community resources:    Current services prior to admission: None            Current DME:              Type of Home Care services:  None    ADLS  Prior functional level:    Current functional level:      PT AM-PAC: 24 /24  OT AM-PAC:   /24    Family can provide assistance at DC:    Would you like Case Management to discuss the discharge plan with any other family members/significant others, and if so, who?    Plans to Return to Present Housing:    Other Identified Issues/Barriers to RETURNING to current housing: MEDICAL COMPLICATIONS, LABS, TESTING, CONSULT  Potential Assistance needed at discharge: N/A            Potential DME:  N/A  Patient expects to discharge to: Apartment  Plan for transportation at discharge: Self    Financial    Payor: UMR / Plan: UMR / Product Type: *No Product

## 2024-12-17 NOTE — PLAN OF CARE
Problem: Skin/Tissue Integrity - Adult  Goal: Incisions, wounds, or drain sites healing without S/S of infection  12/16/2024 2124 by Brianna Michaud RN  Outcome: Progressing

## 2024-12-17 NOTE — PROGRESS NOTES
Accessibility: Accessible  Home Equipment: None  Has the patient had two or more falls in the past year or any fall with injury in the past year?: No  Receives Help From: Family  Prior Level of Assist for ADLs: Independent  Prior Level of Assist for Homemaking: Independent  Homemaking Responsibilities: Yes  Prior Level of Assist for Transfers: Independent  Active : Yes  Mode of Transportation: Car  Occupation: Full time employment  Type of Occupation: [employer]    OBJECTIVE:     Orientation Status:  Orientation  Overall Orientation Status: Within Functional Limits  Orientation Level: Oriented X4    Observation:  Observation/Palpation  Observation: ambulating to bathroom upon arrival. +IV. no acute distress noted.    Cognition Status:  Cognition  Overall Cognitive Status: WFL    Perception Status:  Perception  Overall Perceptual Status: WFL    Vision and Hearing Status:  Vision  Vision: Impaired  Vision Exceptions: Wears glasses at all times   Hearing  Hearing: Within functional limits    GROSS ASSESSMENT AROM/PROM:  AROM: Within functional limits       ROM:   LUE AROM (degrees)  LUE AROM : WFL  Left Hand AROM (degrees)  Left Hand AROM: WFL  RUE AROM (degrees)  RUE AROM : WFL  Right Hand AROM (degrees)  Right Hand AROM: WFL    UE STRENGTH:  Strength: Within functional limits    UE COORDINATION:  Coordination: Within functional limits    UE TONE:  Tone: Normal    UE SENSATION:  Sensation: Intact    Hand Dominance:  Hand Dominance  Hand Dominance: Right    ADL Status:  ADL  Feeding: Unable to assess (Comment)  Grooming: Independent  UE Bathing: Independent  LE Bathing: Independent  UE Dressing: Independent  LE Dressing: Independent  Putting On/Taking Off Footwear: Independent  Toileting: Independent  Toilet Transfers  Toilet Transfer: Independent    Functional Mobility:    Transfers  Stand Step Transfers: Independent  Sit to stand: Independent  Stand to sit: Independent  Transfer Comments: no

## 2024-12-17 NOTE — PLAN OF CARE
Problem: Discharge Planning  Goal: Discharge to home or other facility with appropriate resources  Outcome: Progressing     Problem: Pain  Goal: Verbalizes/displays adequate comfort level or baseline comfort level  Outcome: Progressing     Problem: Safety - Adult  Goal: Free from fall injury  12/16/2024 2124 by Brianna Michaud RN  Outcome: Progressing  12/16/2024 0808 by Vernon Abreu RN  Outcome: Progressing     Problem: Skin/Tissue Integrity - Adult  Goal: Skin integrity remains intact  Outcome: Progressing  Goal: Incisions, wounds, or drain sites healing without S/S of infection  Outcome: Progressing  Goal: Oral mucous membranes remain intact  Outcome: Progressing     Problem: Gastrointestinal - Adult  Goal: Minimal or absence of nausea and vomiting  Outcome: Progressing  Goal: Maintains or returns to baseline bowel function  Outcome: Progressing  Goal: Maintains adequate nutritional intake  Outcome: Progressing

## 2024-12-17 NOTE — PROGRESS NOTES
Hospitalist Progress Note      PCP: Tomas Byers MD    Date of Admission: 12/16/2024    Chief Complaint:  no acute events, is afebrile, stable HD, s/p laparoscopic cholecystectomy yesterday per surgical service, feels better    Medications:  Reviewed    Infusion Medications    sodium chloride       Scheduled Medications    sodium chloride flush  5-40 mL IntraVENous 2 times per day    enoxaparin  30 mg SubCUTAneous BID    piperacillin-tazobactam  4,500 mg IntraVENous Q8H     PRN Meds: sodium chloride flush, sodium chloride, potassium chloride **OR** potassium alternative oral replacement **OR** potassium chloride, magnesium sulfate, ondansetron **OR** ondansetron, melatonin, polyethylene glycol, acetaminophen **OR** acetaminophen, ketorolac, HYDROmorphone **OR** HYDROmorphone      Intake/Output Summary (Last 24 hours) at 12/17/2024 1403  Last data filed at 12/17/2024 0925  Gross per 24 hour   Intake 1440 ml   Output 30 ml   Net 1410 ml       Exam:    /68   Pulse 86   Temp 97.9 °F (36.6 °C) (Oral)   Resp 18   Ht 1.676 m (5' 6\")   Wt 123.7 kg (272 lb 9.6 oz)   LMP 12/12/2024   SpO2 98%   BMI 44.00 kg/m²     General appearance: appears stated age and cooperative.  Respiratory:  clear to auscultation, bilaterally   Cardiovascular: Regular rate and rhythm, S1/S2.  Abdomen: Soft, active bowel sounds.  Musculoskeletal: No edema bilaterally.        Labs:   Recent Labs     12/15/24  2127 12/16/24  0546 12/17/24  0517   WBC 8.4 7.2 11.5*   HGB 15.5 13.2 12.9   HCT 47.8* 40.2 38.2    325 338     Recent Labs     12/15/24  2127 12/16/24  0546 12/17/24  0517    139 138   K 3.9 4.0 4.4    105 105   CO2 24 23 19*   BUN 8 7 6   CREATININE 0.49* 0.45* 0.48*   CALCIUM 10.2* 9.1 8.7     Recent Labs     12/15/24  2127 12/15/24  2354 12/16/24  0546 12/17/24  0517   *  --  280* 121*   *  --  434* 315*   BILIDIR  --  3.8*  --   --    BILITOT 4.8* 4.9* 4.2* 1.3*   ALKPHOS 295*  --  262* 251*

## 2024-12-17 NOTE — CARE COORDINATION
MET W/PT TO ASSESS NEEDS AND DISCUSS DISCHARGE PLAN WHICH IS HOME. PT DENIES HOME GOING NEEDS. REMAINS INDEPENDENT IN ROOM AND HALLS. ON RA. HAS MARCIA DRAIN. FOLLOWING LIVER FUNCTION LABS PER SURGERY.

## 2024-12-17 NOTE — PROGRESS NOTES
Patient seen and examined    Doing well following cholecystectomy.  She did have acute cholecystitis.    Common bile duct exploration showed no evidence of choledocholithiasis and no obstruction.    MARCIA drain serosanguineous.    LFTs tomorrow will be reevaluated.  If they continue to trend upward, she might need further evaluation at a higher level of care.  If they trend downward, continued conservative treatment.

## 2024-12-18 VITALS
BODY MASS INDEX: 43.81 KG/M2 | TEMPERATURE: 97.7 F | WEIGHT: 272.6 LBS | HEART RATE: 62 BPM | OXYGEN SATURATION: 99 % | RESPIRATION RATE: 18 BRPM | HEIGHT: 66 IN | SYSTOLIC BLOOD PRESSURE: 110 MMHG | DIASTOLIC BLOOD PRESSURE: 61 MMHG

## 2024-12-18 LAB
ALBUMIN SERPL-MCNC: 3.2 G/DL (ref 3.5–4.6)
ALP SERPL-CCNC: 192 U/L (ref 40–130)
ALT SERPL-CCNC: 211 U/L (ref 0–33)
ANION GAP SERPL CALCULATED.3IONS-SCNC: 11 MEQ/L (ref 9–15)
AST SERPL-CCNC: 64 U/L (ref 0–35)
BASOPHILS # BLD: 0.1 K/UL (ref 0–0.2)
BASOPHILS NFR BLD: 0.9 %
BILIRUB SERPL-MCNC: 0.9 MG/DL (ref 0.2–0.7)
BUN SERPL-MCNC: 10 MG/DL (ref 6–20)
CALCIUM SERPL-MCNC: 8.6 MG/DL (ref 8.5–9.9)
CHLORIDE SERPL-SCNC: 106 MEQ/L (ref 95–107)
CO2 SERPL-SCNC: 22 MEQ/L (ref 20–31)
CREAT SERPL-MCNC: 0.57 MG/DL (ref 0.5–0.9)
EOSINOPHIL # BLD: 0.2 K/UL (ref 0–0.7)
EOSINOPHIL NFR BLD: 2.1 %
ERYTHROCYTE [DISTWIDTH] IN BLOOD BY AUTOMATED COUNT: 13.2 % (ref 11.5–14.5)
GLOBULIN SER CALC-MCNC: 2.8 G/DL (ref 2.3–3.5)
GLUCOSE SERPL-MCNC: 81 MG/DL (ref 70–99)
HCT VFR BLD AUTO: 36.8 % (ref 37–47)
HGB BLD-MCNC: 11.7 G/DL (ref 12–16)
LYMPHOCYTES # BLD: 4.2 K/UL (ref 1–4.8)
LYMPHOCYTES NFR BLD: 52.2 %
MCH RBC QN AUTO: 29 PG (ref 27–31.3)
MCHC RBC AUTO-ENTMCNC: 31.8 % (ref 33–37)
MCV RBC AUTO: 91.1 FL (ref 79.4–94.8)
MONOCYTES # BLD: 0.5 K/UL (ref 0.2–0.8)
MONOCYTES NFR BLD: 5.9 %
NEUTROPHILS # BLD: 3.2 K/UL (ref 1.4–6.5)
NEUTS SEG NFR BLD: 38.8 %
PLATELET # BLD AUTO: 302 K/UL (ref 130–400)
POTASSIUM SERPL-SCNC: 4.1 MEQ/L (ref 3.4–4.9)
PROT SERPL-MCNC: 6 G/DL (ref 6.3–8)
RBC # BLD AUTO: 4.04 M/UL (ref 4.2–5.4)
SODIUM SERPL-SCNC: 139 MEQ/L (ref 135–144)
WBC # BLD AUTO: 8.1 K/UL (ref 4.8–10.8)

## 2024-12-18 PROCEDURE — 2580000003 HC RX 258: Performed by: COLON & RECTAL SURGERY

## 2024-12-18 PROCEDURE — 6360000002 HC RX W HCPCS: Performed by: COLON & RECTAL SURGERY

## 2024-12-18 PROCEDURE — 2500000003 HC RX 250 WO HCPCS: Performed by: COLON & RECTAL SURGERY

## 2024-12-18 PROCEDURE — 80053 COMPREHEN METABOLIC PANEL: CPT

## 2024-12-18 PROCEDURE — 36415 COLL VENOUS BLD VENIPUNCTURE: CPT

## 2024-12-18 PROCEDURE — 85025 COMPLETE CBC W/AUTO DIFF WBC: CPT

## 2024-12-18 PROCEDURE — 99024 POSTOP FOLLOW-UP VISIT: CPT | Performed by: COLON & RECTAL SURGERY

## 2024-12-18 RX ORDER — OXYCODONE AND ACETAMINOPHEN 5; 325 MG/1; MG/1
1 TABLET ORAL EVERY 6 HOURS PRN
Qty: 12 TABLET | Refills: 0 | Status: SHIPPED | OUTPATIENT
Start: 2024-12-18 | End: 2024-12-20 | Stop reason: ALTCHOICE

## 2024-12-18 RX ADMIN — PIPERACILLIN AND TAZOBACTAM 4500 MG: 4; .5 INJECTION, POWDER, FOR SOLUTION INTRAVENOUS at 02:03

## 2024-12-18 RX ADMIN — SODIUM CHLORIDE, PRESERVATIVE FREE 10 ML: 5 INJECTION INTRAVENOUS at 09:28

## 2024-12-18 ASSESSMENT — PAIN SCALES - WONG BAKER: WONGBAKER_NUMERICALRESPONSE: NO HURT

## 2024-12-18 ASSESSMENT — PAIN SCALES - GENERAL: PAINLEVEL_OUTOF10: 0

## 2024-12-18 NOTE — PLAN OF CARE
Problem: Discharge Planning  Goal: Discharge to home or other facility with appropriate resources  Recent Flowsheet Documentation  Taken 12/18/2024 0945 by Vernon Abreu, RN  Discharge to home or other facility with appropriate resources: Identify barriers to discharge with patient and caregiver  12/18/2024 0458 by Elaine Lai, RN  Outcome: Progressing  Flowsheets (Taken 12/17/2024 0924 by Vernon Abreu, RN)  Discharge to home or other facility with appropriate resources: Identify barriers to discharge with patient and caregiver

## 2024-12-18 NOTE — PROGRESS NOTES
[facility] called to update on a bed available at [facility]. Pt is no longer going to [facility]. Transfer cancelled. Spoke with SALLIE regarding situation.

## 2024-12-18 NOTE — DISCHARGE SUMMARY
Hospital Medicine Discharge Summary    Jina Hobson  :  2000  MRN:  [id number]    Admit date:  2024  Discharge date:  2024    Admitting Physician:  Tramaine Mcallister MD  Primary Care Physician:  Tomas Byers MD      Discharge Diagnoses:    Principal Problem:    Choledocholithiasis  Active Problems:    Acute cholecystitis    Abnormal liver enzymes    Epigastric pain  Resolved Problems:    * No resolved hospital problems. *      Hospital Course:   Jina Hobson is a 24 y.o. female that was admitted and treated at [facility] for the following medical issues:     RUQ abdominal pain and elevated LFTs  - CT of A/P and RUQ u/s showed cholelithiasis, no CBD dilation per report  - s/p laparoscopic cholecystectomy with CBD exploration on   - clinically improved  - LFTs were improving  - followed by GI and surgical service        Disposition - home      Patient was seen by the following consultants while admitted to [facility]:   Consults:  IP CONSULT TO GI  IP CONSULT TO GENERAL SURGERY    Significant Diagnostic Studies:    US GALLBLADDER RUQ    Result Date: 2024  EXAMINATION: RIGHT UPPER QUADRANT ULTRASOUND 2024 8:47 am COMPARISON: CT AP 12/15/24 HISTORY: ORDERING SYSTEM PROVIDED HISTORY: Elevated LFTs with cholelithiasis TECHNOLOGIST PROVIDED HISTORY: Reason for exam:->Elevated LFTs with cholelithiasis What reading provider will be dictating this exam?->CRC FINDINGS: The pancreas is obscured by overlying bowel gas. The liver measures 15.8cm in length and demonstrates an increased echotexture and is difficult to penetrate.  There is no intrahepatic biliary dilatation. No masses are visualized. The main portal vein appears patent. The gallbladder appears distended and demonstrates multiple gallstones without pericholecystic fluid or wall thickening. The common bile duct measures 0.4 cm. Negative sonographic Alcaraz's sign. The right kidney measures 10.5cm

## 2024-12-18 NOTE — PROGRESS NOTES
Postop day #2 laparoscopic cholecystectomy with common bile duct exploration    Liver enzymes continue to trend to almost normal.    Her MARCIA drain is serous.    She is tolerating a diet and afebrile    Discharge today.  My office 1224    DC MARCIA drain.    Instructions regarding activity, medication, follow-up and wound care given at the bedside.    Discussed with nurse.

## 2024-12-18 NOTE — PROGRESS NOTES
Discharge instructions provided to patient . Verbalized understanding of follow up appointments, diet, activity, wound care, medications and reasons to return to ED/call physician. All questions answered. Copy of discharge instructions provided. Pt eating lunch, awaiting family arrival for transport home. Denies further needs.

## 2024-12-18 NOTE — PLAN OF CARE
Problem: Discharge Planning  Goal: Discharge to home or other facility with appropriate resources  Outcome: Progressing  Flowsheets (Taken 12/17/2024 0924 by Vernon Abreu, RN)  Discharge to home or other facility with appropriate resources: Identify barriers to discharge with patient and caregiver     Problem: Pain  Goal: Verbalizes/displays adequate comfort level or baseline comfort level  Outcome: Progressing  Flowsheets (Taken 12/16/2024 0353)  Verbalizes/displays adequate comfort level or baseline comfort level:   Encourage patient to monitor pain and request assistance   Assess pain using appropriate pain scale   Administer analgesics based on type and severity of pain and evaluate response   Implement non-pharmacological measures as appropriate and evaluate response     Problem: Safety - Adult  Goal: Free from fall injury  Outcome: Progressing  Flowsheets (Taken 12/18/2024 0458)  Free From Fall Injury:   Instruct family/caregiver on patient safety   Based on caregiver fall risk screen, instruct family/caregiver to ask for assistance with transferring infant if caregiver noted to have fall risk factors     Problem: Skin/Tissue Integrity - Adult  Goal: Skin integrity remains intact  Flowsheets (Taken 12/18/2024 0458)  Skin Integrity Remains Intact: Monitor for areas of redness and/or skin breakdown

## 2024-12-18 NOTE — DISCHARGE INSTRUCTIONS
May shower    No driving while on pain medication    No lifting greater than 10 pounds for the next week    May take stairs    My office 12/24/2024.

## 2024-12-19 ENCOUNTER — TELEPHONE (OUTPATIENT)
Dept: FAMILY MEDICINE CLINIC | Age: 24
End: 2024-12-19

## 2024-12-19 NOTE — TELEPHONE ENCOUNTER
Care Transitions Initial Follow Up Call    Outreach made within 2 business days of discharge: Yes    Patient: Jina Hobson Patient : 2000   MRN: [id number]  Reason for Admission: Choledocholithiasis   Discharge Date: 24       Spoke with: PT    Discharge department/facility: [place]    TCM Interactive Patient Contact:  Was patient able to fill all prescriptions: Yes  Was patient instructed to bring all medications to the follow-up visit: Yes  Is patient taking all medications as directed in the discharge summary? Yes  Does patient understand their discharge instructions: Yes  Does patient have questions or concerns that need addressed prior to 7-14 day follow up office visit: no    Additional needs identified to be addressed with provider  No needs identified             Scheduled appointment with PCP within 7-14 days    Follow Up  Future Appointments   Date Time Provider Department Center   2024  1:15 PM Tomas Byers MD [facility]   2024  9:00 AM Kiko Griffiths MD [facility] Kathryn Dean, MA

## 2024-12-20 ENCOUNTER — OFFICE VISIT (OUTPATIENT)
Dept: FAMILY MEDICINE CLINIC | Age: 24
End: 2024-12-20

## 2024-12-20 VITALS
DIASTOLIC BLOOD PRESSURE: 72 MMHG | BODY MASS INDEX: 47.09 KG/M2 | HEIGHT: 66 IN | SYSTOLIC BLOOD PRESSURE: 108 MMHG | WEIGHT: 293 LBS | OXYGEN SATURATION: 98 % | HEART RATE: 78 BPM

## 2024-12-20 DIAGNOSIS — K80.50 CHOLEDOCHOLITHIASIS: ICD-10-CM

## 2024-12-20 DIAGNOSIS — Z09 HOSPITAL DISCHARGE FOLLOW-UP: Primary | ICD-10-CM

## 2024-12-20 DIAGNOSIS — K81.0 ACUTE CHOLECYSTITIS: ICD-10-CM

## 2024-12-20 PROBLEM — E66.813 CLASS 3 SEVERE OBESITY DUE TO EXCESS CALORIES WITHOUT SERIOUS COMORBIDITY WITH BODY MASS INDEX (BMI) OF 40.0 TO 44.9 IN ADULT: Status: RESOLVED | Noted: 2019-09-17 | Resolved: 2024-12-20

## 2024-12-20 PROBLEM — E66.01 CLASS 3 SEVERE OBESITY DUE TO EXCESS CALORIES WITHOUT SERIOUS COMORBIDITY WITH BODY MASS INDEX (BMI) OF 40.0 TO 44.9 IN ADULT: Status: RESOLVED | Noted: 2019-09-17 | Resolved: 2024-12-20

## 2024-12-20 PROBLEM — R10.13 EPIGASTRIC PAIN: Status: RESOLVED | Noted: 2024-12-17 | Resolved: 2024-12-20

## 2024-12-20 PROBLEM — R74.8 ABNORMAL LIVER ENZYMES: Status: RESOLVED | Noted: 2024-12-17 | Resolved: 2024-12-20

## 2024-12-20 SDOH — ECONOMIC STABILITY: FOOD INSECURITY: WITHIN THE PAST 12 MONTHS, YOU WORRIED THAT YOUR FOOD WOULD RUN OUT BEFORE YOU GOT MONEY TO BUY MORE.: NEVER TRUE

## 2024-12-20 SDOH — ECONOMIC STABILITY: FOOD INSECURITY: WITHIN THE PAST 12 MONTHS, THE FOOD YOU BOUGHT JUST DIDN'T LAST AND YOU DIDN'T HAVE MONEY TO GET MORE.: NEVER TRUE

## 2024-12-20 SDOH — ECONOMIC STABILITY: INCOME INSECURITY: HOW HARD IS IT FOR YOU TO PAY FOR THE VERY BASICS LIKE FOOD, HOUSING, MEDICAL CARE, AND HEATING?: NOT HARD AT ALL

## 2024-12-20 NOTE — PROGRESS NOTES
[facility] PRIMARY CARE  [address] [place] [id number]  Dept: [phone]  Dept Fax: [phone]     Chief Complaint:  Chief Complaint   Patient presents with    Follow-Up from [facility] 12/16/24 for Choledocholithiasis/ Laparoscopic cholecystectomy with cholangiogram and common bile duct exploration.       Vitals:    12/20/24 1311   BP: 108/72   Pulse: 78   SpO2: 98%   Weight: 133.4 kg (294 lb)   Height: 1.676 m (5' 6\")       HPI:  24 y.o.female who presents for the following:      Hosp f/u: admitted for abd pain; found to have gallstones and elevated LFTs so underwent surgery; no n/v; tolerating; pain almost gone    Admit date:  12/16/2024                      Discharge date:  12/18/2024   Hospital Course:   Jina Hobson is a 24 y.o. female that was admitted and treated at [facility] for the following medical issues:      RUQ abdominal pain and elevated LFTs  - CT of A/P and RUQ u/s showed cholelithiasis, no CBD dilation per report  - s/p laparoscopic cholecystectomy with CBD exploration on 12/16  - clinically improved  - LFTs were improving  - followed by GI and surgical service        Disposition - home    -----------------------------------------------------------------------------    Assessment/Plan:  24 y.o. female here mainly for the following:  Hosp f/u  Benign exam; has surgery f/u in place  Pain controlled        ICD-10-CM    1. Hospital discharge follow-up  Z09 [place] DISCHARGE MEDS RECONCILED W/ CURRENT OUTPATIENT MED LIST      2. Choledocholithiasis  K80.50       3. Acute cholecystitis  K81.0           Return if symptoms worsen or fail to improve.    Tomas Byers MD      -----------------------------------------------------------------------------      Objective     Physical Exam:  Physical Exam  Vitals reviewed.   Constitutional:       General: She is not in acute distress.     Appearance: She is well-developed.   HENT:      Head:

## 2024-12-24 ENCOUNTER — OFFICE VISIT (OUTPATIENT)
Dept: SURGERY | Age: 24
End: 2024-12-24

## 2024-12-24 VITALS
WEIGHT: 293 LBS | BODY MASS INDEX: 47.09 KG/M2 | OXYGEN SATURATION: 98 % | HEART RATE: 86 BPM | HEIGHT: 66 IN | TEMPERATURE: 97.5 F

## 2024-12-24 DIAGNOSIS — K81.0 ACUTE CHOLECYSTITIS: ICD-10-CM

## 2024-12-24 DIAGNOSIS — K81.0 ACUTE CHOLECYSTITIS: Primary | ICD-10-CM

## 2024-12-24 LAB
ALBUMIN SERPL-MCNC: 4.1 G/DL (ref 3.5–4.6)
ALP SERPL-CCNC: 156 U/L (ref 40–130)
ALT SERPL-CCNC: 72 U/L (ref 0–33)
ANION GAP SERPL CALCULATED.3IONS-SCNC: 13 MEQ/L (ref 9–15)
AST SERPL-CCNC: 22 U/L (ref 0–35)
BILIRUB SERPL-MCNC: 0.5 MG/DL (ref 0.2–0.7)
BUN SERPL-MCNC: 12 MG/DL (ref 6–20)
CALCIUM SERPL-MCNC: 9.9 MG/DL (ref 8.5–9.9)
CHLORIDE SERPL-SCNC: 101 MEQ/L (ref 95–107)
CO2 SERPL-SCNC: 24 MEQ/L (ref 20–31)
CREAT SERPL-MCNC: 0.58 MG/DL (ref 0.5–0.9)
GLOBULIN SER CALC-MCNC: 3.6 G/DL (ref 2.3–3.5)
GLUCOSE SERPL-MCNC: 91 MG/DL (ref 70–99)
POTASSIUM SERPL-SCNC: 4.3 MEQ/L (ref 3.4–4.9)
PROT SERPL-MCNC: 7.7 G/DL (ref 6.3–8)
SODIUM SERPL-SCNC: 138 MEQ/L (ref 135–144)

## 2024-12-24 PROCEDURE — 99024 POSTOP FOLLOW-UP VISIT: CPT | Performed by: COLON & RECTAL SURGERY

## 2024-12-24 NOTE — PROGRESS NOTES
Subjective:      Patient ID: Jina Hobson is a 24 y.o. female who presents for:  Chief Complaint   Patient presents with    Post-Op Check       She returns to the office 8 days out from a laparoscopic cholecystectomy with common bile duct exploration    Patient doing well.  Plans to return to work in 2 days    Eating well.  Bowels working.        Past Medical History:   Diagnosis Date    COVID-19     12/23/2021     Past Surgical History:   Procedure Laterality Date    CHOLECYSTECTOMY, LAPAROSCOPIC N/A 12/16/2024    Laparoscopic cholecystectomy with cholangiogram and common bile duct exploration performed by Kiko Griffiths MD at [facility] OR    TONSILLECTOMY AND ADENOIDECTOMY      WISDOM TOOTH EXTRACTION       Social History     Socioeconomic History    Marital status: Single     Spouse name: Not on file    Number of children: Not on file    Years of education: Not on file    Highest education level: Not on file   Occupational History    Not on file   Tobacco Use    Smoking status: Never     Passive exposure: Yes    Smokeless tobacco: Never   Substance and Sexual Activity    Alcohol use: No    Drug use: No    Sexual activity: Never   Other Topics Concern    Not on file   Social History Narrative    Not on file     Social Determinants of Health     Financial Resource Strain: Low Risk  (12/20/2024)    Overall Financial Resource Strain (CARDIA)     Difficulty of Paying Living Expenses: Not hard at all   Food Insecurity: No Food Insecurity (12/20/2024)    Hunger Vital Sign     Worried About Running Out of Food in the Last Year: Never true     Ran Out of Food in the Last Year: Never true   Transportation Needs: No Transportation Needs (12/20/2024)    PRAPARE - Transportation     Lack of Transportation (Medical): No     Lack of Transportation (Non-Medical): No   Physical Activity: Not on file   Stress: Not on file   Social Connections: Not on file   Intimate Partner Violence: Not on file   Housing Stability: Low Risk

## 2025-05-16 ENCOUNTER — OFFICE VISIT (OUTPATIENT)
Dept: FAMILY MEDICINE CLINIC | Age: 25
End: 2025-05-16
Payer: COMMERCIAL

## 2025-05-16 VITALS
OXYGEN SATURATION: 98 % | DIASTOLIC BLOOD PRESSURE: 72 MMHG | SYSTOLIC BLOOD PRESSURE: 114 MMHG | BODY MASS INDEX: 47.09 KG/M2 | HEIGHT: 66 IN | WEIGHT: 293 LBS | HEART RATE: 86 BPM

## 2025-05-16 DIAGNOSIS — Z23 ENCOUNTER FOR IMMUNIZATION: ICD-10-CM

## 2025-05-16 DIAGNOSIS — Z11.1 TUBERCULOSIS SCREENING: ICD-10-CM

## 2025-05-16 DIAGNOSIS — Z00.00 ANNUAL PHYSICAL EXAM: Primary | ICD-10-CM

## 2025-05-16 PROCEDURE — 99395 PREV VISIT EST AGE 18-39: CPT | Performed by: FAMILY MEDICINE

## 2025-05-16 ASSESSMENT — PATIENT HEALTH QUESTIONNAIRE - PHQ9
2. FEELING DOWN, DEPRESSED OR HOPELESS: NOT AT ALL
1. LITTLE INTEREST OR PLEASURE IN DOING THINGS: NOT AT ALL
SUM OF ALL RESPONSES TO PHQ QUESTIONS 1-9: 0

## 2025-05-16 NOTE — PROGRESS NOTES
[facility] PRIMARY [facility]  [address] [place] [id number]  Dept: [phone]  Dept Fax: [phone]     Chief Complaint:  Chief Complaint   Patient presents with    Annual Exam     Going into nursing school- brought forms       Vitals:    05/16/25 1108   BP: 114/72   Pulse: 86   SpO2: 98%   Weight: 133.4 kg (294 lb)   Height: 1.676 m (5' 6\")       HPI:  25 y.o.female who presents for the following:      Starting nursing school soon at [employer]; needs immunizations and titers updated; planning peds ICU in the future    -----------------------------------------------------------------------------    Assessment/Plan:  25 y.o. female here mainly for the following:  Annual  Due for tetanus, Hep B titer, and TB screen  Will fill her forms and return when results return        ICD-10-CM    1. Annual physical exam  Z00.00       2. Tuberculosis screening  Z11.1 Quantiferon TB Gold      3. Encounter for immunization  Z23 Hepatitis B Surface Antibody          Return in about 1 year (around 5/16/2026) for annual exam.    Tomas Byers MD      -----------------------------------------------------------------------------      Objective     Physical Exam:  Physical Exam  Vitals reviewed.   Constitutional:       General: She is not in acute distress.     Appearance: She is well-developed.   HENT:      Head: Normocephalic and atraumatic.      Right Ear: Tympanic membrane, ear canal and external ear normal. Tympanic membrane is not erythematous. Tympanic membrane has normal mobility.      Left Ear: Tympanic membrane, ear canal and external ear normal. Tympanic membrane is not erythematous. Tympanic membrane has normal mobility.      Nose: Nose normal.      Mouth/Throat:      Pharynx: No oropharyngeal exudate.   Neck:      Thyroid: No thyromegaly.   Cardiovascular:      Rate and Rhythm: Normal rate and regular rhythm.      Heart sounds: Normal heart sounds. No murmur heard.  Pulmonary:      Effort: Pulmonary

## 2025-05-17 ENCOUNTER — RESULTS FOLLOW-UP (OUTPATIENT)
Dept: FAMILY MEDICINE CLINIC | Age: 25
End: 2025-05-17

## 2025-05-17 LAB — HBV SURFACE AB TITR SER: <3.5 MIU/ML

## 2025-05-18 LAB
GAMMA INTERFERON BACKGROUND BLD IA-ACNC: 0.03 IU/ML
M TB IFN-G BLD-IMP: NEGATIVE
M TB IFN-G CD4+ BCKGRND COR BLD-ACNC: 0.03 IU/ML
M TB IFN-G CD4+CD8+ BCKGRND COR BLD-ACNC: 0.03 IU/ML
MITOGEN IGNF BCKGRD COR BLD-ACNC: 9.97 IU/ML

## 2025-05-19 ENCOUNTER — CLINICAL SUPPORT (OUTPATIENT)
Dept: FAMILY MEDICINE CLINIC | Age: 25
End: 2025-05-19
Payer: COMMERCIAL

## 2025-05-19 DIAGNOSIS — Z23 ENCOUNTER FOR IMMUNIZATION: Primary | ICD-10-CM

## 2025-05-19 PROCEDURE — 90471 IMMUNIZATION ADMIN: CPT | Performed by: FAMILY MEDICINE

## 2025-05-19 PROCEDURE — 90715 TDAP VACCINE 7 YRS/> IM: CPT | Performed by: FAMILY MEDICINE

## 2025-05-19 PROCEDURE — 90472 IMMUNIZATION ADMIN EACH ADD: CPT | Performed by: FAMILY MEDICINE

## 2025-05-19 PROCEDURE — 90746 HEPB VACCINE 3 DOSE ADULT IM: CPT | Performed by: FAMILY MEDICINE

## (undated) DEVICE — STERILE COTTON BALLS LARGE 5/P: Brand: MEDLINE

## (undated) DEVICE — BANDAGE ADH W2XL4IN NITRL FAB STRP CURAD

## (undated) DEVICE — FOGARTY THRU-LUMEN EMBOLECTOMY CATHETER 6F 80CM: Brand: FOGARTY

## (undated) DEVICE — NITINOL STONE RETRIEVAL BASKET: Brand: ESCAPE

## (undated) DEVICE — SUTURE MONOCRYL SZ 4-0 L27IN ABSRB UD L19MM PS-2 1/2 CIR PRIM Y426H

## (undated) DEVICE — C-ARM: Brand: UNBRANDED

## (undated) DEVICE — ELECTRODE LAP L36CM PTFE WIRE J HK CLEANCOAT

## (undated) DEVICE — SYRINGE MED 30ML STD CLR PLAS LUERLOCK TIP N CTRL DISP

## (undated) DEVICE — SUTURE VICRYL ENDOLOOP SZ 0 L18IN ABSRB VLT LIG SLDE KNOT

## (undated) DEVICE — BANDAGE ADH W0.75XL3IN UNIV WVN FAB NAT GEN USE STRP N ADH

## (undated) DEVICE — TROCAR: Brand: KII® SLEEVE

## (undated) DEVICE — STOPCOCK 3-WAY 45 PSI LOW OFF ROT COLAR

## (undated) DEVICE — DRAIN SURG 19FR 0.25IN SIL RND W/ TRCR INDIC DOT RADPQ FULL

## (undated) DEVICE — SET,IRRIGATION,CYSTO/TUR: Brand: MEDLINE

## (undated) DEVICE — CATHETER IV 12GA L76MM EXTN DIA2.8MM FEP POLYMER THRM

## (undated) DEVICE — SUTURE VICRYL + SZ 0 L27IN ABSRB VLT L26MM UR-6 5/8 CIR VCP603H

## (undated) DEVICE — SUTURE VICRYL SZ 0 L36IN ABSRB UD L36MM CT-1 1/2 CIR J946H

## (undated) DEVICE — APPLIER CLP M L L11.4IN DIA10MM ENDOSCP ROT MULT FOR LIG

## (undated) DEVICE — STRIP,CLOSURE,WOUND,MEDI-STRIP,1/2X4: Brand: MEDLINE

## (undated) DEVICE — NEPTUNE E-SEP SMOKE EVACUATION PENCIL, COATED, 70MM BLADE, PUSH BUTTON SWITCH: Brand: NEPTUNE E-SEP

## (undated) DEVICE — 4.5 MM CANNULA AND OBTURATOR,                                    CONICAL TIP, DISTAL HOLES

## (undated) DEVICE — GENERAL LAPAROSCOPY: Brand: MEDLINE INDUSTRIES, INC.

## (undated) DEVICE — Device

## (undated) DEVICE — SINGLE-USE DIGITAL FLEXIBLE URETEROSCOPE: Brand: LITHOVUE

## (undated) DEVICE — OPEN-END URETERAL CATHETER: Brand: COOK

## (undated) DEVICE — GLOVE ORANGE PI 7 1/2   MSG9075

## (undated) DEVICE — LAPAROSCOPIC TROCAR SLEEVE/SINGLE USE: Brand: KII® OPTICAL ACCESS SYSTEM

## (undated) DEVICE — RESERVOIR,SUCTION,100CC,SILICONE: Brand: MEDLINE

## (undated) DEVICE — TROCAR: Brand: KII FIOS FIRST ENTRY

## (undated) DEVICE — TISSUE RETRIEVAL SYSTEM: Brand: INZII RETRIEVAL SYSTEM